# Patient Record
Sex: FEMALE | Race: WHITE | Employment: OTHER | URBAN - METROPOLITAN AREA
[De-identification: names, ages, dates, MRNs, and addresses within clinical notes are randomized per-mention and may not be internally consistent; named-entity substitution may affect disease eponyms.]

---

## 2017-11-20 ENCOUNTER — HOSPITAL ENCOUNTER (OUTPATIENT)
Dept: MAMMOGRAPHY | Age: 62
Discharge: HOME | End: 2017-11-20
Attending: FAMILY MEDICINE | Admitting: FAMILY MEDICINE

## 2017-11-20 DIAGNOSIS — Z12.31 VISIT FOR SCREENING MAMMOGRAM: ICD-10-CM

## 2018-11-21 ENCOUNTER — HOSPITAL ENCOUNTER (OUTPATIENT)
Dept: MAMMOGRAPHY | Age: 63
Discharge: HOME | End: 2018-11-26
Payer: MEDICARE

## 2018-11-21 DIAGNOSIS — Z12.31 VISIT FOR SCREENING MAMMOGRAM: ICD-10-CM

## 2018-11-21 PROCEDURE — 77067 SCR MAMMO BI INCL CAD: CPT

## 2019-08-13 ENCOUNTER — ANESTHESIA EVENT (OUTPATIENT)
Dept: OPERATING ROOM | Age: 64
DRG: 455 | End: 2019-08-13
Payer: MEDICARE

## 2019-08-13 RX ORDER — PREDNISONE 1 MG/1
10 TABLET ORAL DAILY PRN
COMMUNITY
Start: 2018-06-19

## 2019-08-13 RX ORDER — OXYCODONE HYDROCHLORIDE AND ACETAMINOPHEN 5; 325 MG/1; MG/1
TABLET ORAL
COMMUNITY
Start: 2019-07-27 | End: 2019-08-13

## 2019-08-13 RX ORDER — HYDROXYCHLOROQUINE SULFATE 200 MG/1
TABLET, FILM COATED ORAL
COMMUNITY
Start: 2018-06-06

## 2019-08-13 RX ORDER — CYCLOSPORINE 0.5 MG/ML
1 EMULSION OPHTHALMIC 2 TIMES DAILY
COMMUNITY
End: 2019-08-13 | Stop reason: ALTCHOICE

## 2019-08-13 RX ORDER — AZELASTINE HYDROCHLORIDE, FLUTICASONE PROPIONATE 137; 50 UG/1; UG/1
1 SPRAY, METERED NASAL
COMMUNITY
Start: 2018-10-23

## 2019-08-13 RX ORDER — NAPROXEN 500 MG/1
500 TABLET ORAL
Status: ON HOLD | COMMUNITY
Start: 2019-01-10 | End: 2019-08-16 | Stop reason: HOSPADM

## 2019-08-13 RX ORDER — BACLOFEN 10 MG/1
10 TABLET ORAL 4 TIMES DAILY
Status: ON HOLD | COMMUNITY
Start: 2019-07-16 | End: 2019-08-16 | Stop reason: HOSPADM

## 2019-08-13 RX ORDER — CYCLOSPORINE 0.5 MG/ML
EMULSION OPHTHALMIC
COMMUNITY
Start: 2019-02-23

## 2019-08-13 RX ORDER — LIDOCAINE 50 MG/G
1 PATCH TOPICAL
COMMUNITY
Start: 2019-01-28

## 2019-08-13 RX ORDER — CHLORAL HYDRATE 500 MG
3000 CAPSULE ORAL 3 TIMES DAILY
Status: ON HOLD | COMMUNITY
End: 2019-08-16 | Stop reason: HOSPADM

## 2019-08-13 RX ORDER — ALPRAZOLAM 1 MG/1
1 TABLET ORAL 2 TIMES DAILY
Status: ON HOLD | COMMUNITY
End: 2022-03-18

## 2019-08-13 RX ORDER — ALBUTEROL SULFATE 90 UG/1
2 AEROSOL, METERED RESPIRATORY (INHALATION) EVERY 6 HOURS PRN
COMMUNITY

## 2019-08-13 RX ORDER — OXYCODONE HYDROCHLORIDE AND ACETAMINOPHEN 5; 325 MG/1; MG/1
1 TABLET ORAL EVERY 4 HOURS PRN
Status: ON HOLD | COMMUNITY
End: 2019-08-16 | Stop reason: HOSPADM

## 2019-08-13 NOTE — PROGRESS NOTES
[address]                          Date of Procedure 8/14/19Time of Procedure  0730  PRIOR TO PROCEDURE DATE:  1. Please follow any guidelines/instructions prior to your procedure as advised by your surgeon. 2. Arrange for someone to drive you home and be with you for the first 24 hours after discharge for your safety after your procedure for which you received sedation. Ensure it is someone we can share information with regarding your discharge. 3. You must contact your surgeon for instructions IF:   You are taking any blood thinners, aspirin, anti-inflammatory or vitamin E.   There is a change in your physical condition such as a cold, fever, rash, cuts, sores or any other infection, especially near your surgical site. 4. Do not drink alcohol the day before or day of your procedure. 5. A Pre-op History and Physical for surgery MUST be completed by your Physician or Urgent Care within 30 days of your procedure date. Please bring a copy with you on the day of your procedure and along with any other testing performed. THE DAY OF YOUR PROCEDURE:  1. Follow instructions for ARRIVAL TIME as DIRECTED BY YOUR SURGEON. If your surgeon does not give you a specific arrival time, please arrive [id number]    2. Enter the MAIN entrance from [address] and follow the signs to the free [employer] or [place] parking (offered free of charge 6am-5pm). 3. Enter the Main Entrance of the hospital (do not enter from the lower level of the parking garage). Upon entrance, check in with the  at the main desk on your left. If no one is available at the desk, proceed into the [facility] Waiting Room and go through the door directly into the [facility]. There is a Check-in desk ACROSS from Room 5 (marked with a sign hanging from the ceiling). The phone number for the surgery center is [phone].     4. Please call [phone] option #2 option #2 if overnight, you may bring a bag with personal items. Please have any large items you may need brought in by your family after your arrival to your hospital room. 15. If you have a Living Will or Durable Power of , please bring a copy on the day of your procedure. 15. With your permission, one family member may accompany you while you are being prepared for surgery. Once you are ready, additional family members may join you. HOW WE KEEP YOU SAFE and WORK TO PREVENT SURGICAL SITE INFECTIONS:  1. Health care workers should always check your ID bracelet to verify your name and birth date. You will be asked many times to state your name, date of birth, and allergies. 2. Health care workers should always clean their hands with soap or alcohol gel before providing care to you. It is okay to ask anyone if they cleaned their hands before they touch you. 3. You will be actively involved in verifying the type of procedure you are having and ensuring the correct surgical site. This will be confirmed multiple times prior to your procedure. Do NOT sanjiv your surgery site UNLESS instructed to by your surgeon. 4. Do not shave or wax for 72 hours prior to procedure near your operative site. Shaving with a razor can irritate your skin and make it easier to develop an infection. On the day of your procedure, any hair that needs to be removed near the surgical site will be clipped by a healthcare worker using a special clippers designed to avoid skin irritation. 5. When you are in the operating room, your surgical site will be cleansed with a special soap, and in most cases, you will be given an antibiotic before the surgery begins. What to expect AFTER YOUR PROCEDURE:  1. Immediately following your procedure, your will be taken to the PACU for the first phase of your recovery.   Your nurse will help you recover from any potential side effects of anesthesia, such as extreme drowsiness, changes in your

## 2019-08-14 ENCOUNTER — APPOINTMENT (OUTPATIENT)
Dept: GENERAL RADIOLOGY | Age: 64
DRG: 455 | End: 2019-08-14
Attending: NEUROLOGICAL SURGERY
Payer: MEDICARE

## 2019-08-14 ENCOUNTER — HOSPITAL ENCOUNTER (INPATIENT)
Age: 64
LOS: 2 days | Discharge: HOME | DRG: 455 | End: 2019-08-16
Attending: NEUROLOGICAL SURGERY | Admitting: NEUROLOGICAL SURGERY
Payer: MEDICARE

## 2019-08-14 ENCOUNTER — ANESTHESIA (OUTPATIENT)
Dept: OPERATING ROOM | Age: 64
DRG: 455 | End: 2019-08-14
Payer: MEDICARE

## 2019-08-14 VITALS — DIASTOLIC BLOOD PRESSURE: 76 MMHG | OXYGEN SATURATION: 100 % | SYSTOLIC BLOOD PRESSURE: 137 MMHG | TEMPERATURE: 95.4 F

## 2019-08-14 DIAGNOSIS — Z98.1 S/P LUMBAR SPINAL FUSION: Primary | ICD-10-CM

## 2019-08-14 PROBLEM — M48.062 LUMBAR STENOSIS WITH NEUROGENIC CLAUDICATION: Status: ACTIVE | Noted: 2019-08-14

## 2019-08-14 LAB
ABO/RH: NORMAL
ANTIBODY SCREEN: NORMAL
CREAT SERPL-MCNC: 0.5 MG/DL (ref 0.6–1.2)
GFR AFRICAN AMERICAN: >60
GFR NON-AFRICAN AMERICAN: >60

## 2019-08-14 PROCEDURE — C1713 ANCHOR/SCREW BN/BN,TIS/BN: HCPCS | Performed by: NEUROLOGICAL SURGERY

## 2019-08-14 PROCEDURE — 36415 COLL VENOUS BLD VENIPUNCTURE: CPT

## 2019-08-14 PROCEDURE — 86850 RBC ANTIBODY SCREEN: CPT

## 2019-08-14 PROCEDURE — 2580000003 HC RX 258: Performed by: NURSE ANESTHETIST, CERTIFIED REGISTERED

## 2019-08-14 PROCEDURE — 7100000000 HC PACU RECOVERY - FIRST 15 MIN: Performed by: NEUROLOGICAL SURGERY

## 2019-08-14 PROCEDURE — 3700000000 HC ANESTHESIA ATTENDED CARE: Performed by: NEUROLOGICAL SURGERY

## 2019-08-14 PROCEDURE — 2700000000 HC OXYGEN THERAPY PER DAY

## 2019-08-14 PROCEDURE — 2580000003 HC RX 258: Performed by: ANESTHESIOLOGY

## 2019-08-14 PROCEDURE — 94770 HC ETCO2 MONITOR DAILY: CPT

## 2019-08-14 PROCEDURE — 2709999900 HC NON-CHARGEABLE SUPPLY: Performed by: NEUROLOGICAL SURGERY

## 2019-08-14 PROCEDURE — 7100000001 HC PACU RECOVERY - ADDTL 15 MIN: Performed by: NEUROLOGICAL SURGERY

## 2019-08-14 PROCEDURE — C1776 JOINT DEVICE (IMPLANTABLE): HCPCS | Performed by: NEUROLOGICAL SURGERY

## 2019-08-14 PROCEDURE — 0SB20ZZ EXCISION OF LUMBAR VERTEBRAL DISC, OPEN APPROACH: ICD-10-PCS | Performed by: NEUROLOGICAL SURGERY

## 2019-08-14 PROCEDURE — 86901 BLOOD TYPING SEROLOGIC RH(D): CPT

## 2019-08-14 PROCEDURE — 3209999900 FLUORO FOR SURGICAL PROCEDURES

## 2019-08-14 PROCEDURE — 6370000000 HC RX 637 (ALT 250 FOR IP): Performed by: NURSE PRACTITIONER

## 2019-08-14 PROCEDURE — 2500000003 HC RX 250 WO HCPCS: Performed by: NURSE PRACTITIONER

## 2019-08-14 PROCEDURE — 0SG10A0 FUSION OF 2 OR MORE LUMBAR VERTEBRAL JOINTS WITH INTERBODY FUSION DEVICE, ANTERIOR APPROACH, ANTERIOR COLUMN, OPEN APPROACH: ICD-10-PCS | Performed by: NEUROLOGICAL SURGERY

## 2019-08-14 PROCEDURE — 2500000003 HC RX 250 WO HCPCS: Performed by: ANESTHESIOLOGY

## 2019-08-14 PROCEDURE — 72100 X-RAY EXAM L-S SPINE 2/3 VWS: CPT

## 2019-08-14 PROCEDURE — 94761 N-INVAS EAR/PLS OXIMETRY MLT: CPT

## 2019-08-14 PROCEDURE — 3600000014 HC SURGERY LEVEL 4 ADDTL 15MIN: Performed by: NEUROLOGICAL SURGERY

## 2019-08-14 PROCEDURE — 86900 BLOOD TYPING SEROLOGIC ABO: CPT

## 2019-08-14 PROCEDURE — 6360000002 HC RX W HCPCS: Performed by: NURSE PRACTITIONER

## 2019-08-14 PROCEDURE — 1200000000 HC SEMI PRIVATE

## 2019-08-14 PROCEDURE — 2500000003 HC RX 250 WO HCPCS: Performed by: NURSE ANESTHETIST, CERTIFIED REGISTERED

## 2019-08-14 PROCEDURE — 2580000003 HC RX 258: Performed by: NEUROLOGICAL SURGERY

## 2019-08-14 PROCEDURE — 2500000003 HC RX 250 WO HCPCS: Performed by: NEUROLOGICAL SURGERY

## 2019-08-14 PROCEDURE — 82565 ASSAY OF CREATININE: CPT

## 2019-08-14 PROCEDURE — 94150 VITAL CAPACITY TEST: CPT

## 2019-08-14 PROCEDURE — 2780000010 HC IMPLANT OTHER: Performed by: NEUROLOGICAL SURGERY

## 2019-08-14 PROCEDURE — 6360000002 HC RX W HCPCS: Performed by: ANESTHESIOLOGY

## 2019-08-14 PROCEDURE — 2580000003 HC RX 258: Performed by: NURSE PRACTITIONER

## 2019-08-14 PROCEDURE — 94799 UNLISTED PULMONARY SVC/PX: CPT

## 2019-08-14 PROCEDURE — 3600000004 HC SURGERY LEVEL 4 BASE: Performed by: NEUROLOGICAL SURGERY

## 2019-08-14 PROCEDURE — 6360000002 HC RX W HCPCS: Performed by: NURSE ANESTHETIST, CERTIFIED REGISTERED

## 2019-08-14 PROCEDURE — 0SG10K1 FUSION OF 2 OR MORE LUMBAR VERTEBRAL JOINTS WITH NONAUTOLOGOUS TISSUE SUBSTITUTE, POSTERIOR APPROACH, POSTERIOR COLUMN, OPEN APPROACH: ICD-10-PCS | Performed by: NEUROLOGICAL SURGERY

## 2019-08-14 PROCEDURE — 2720000010 HC SURG SUPPLY STERILE: Performed by: NEUROLOGICAL SURGERY

## 2019-08-14 PROCEDURE — 3700000001 HC ADD 15 MINUTES (ANESTHESIA): Performed by: NEUROLOGICAL SURGERY

## 2019-08-14 PROCEDURE — 6360000002 HC RX W HCPCS: Performed by: NEUROLOGICAL SURGERY

## 2019-08-14 DEVICE — DBM 7509145 MAGNIFUSE 1.75 X 5 CM
Type: IMPLANTABLE DEVICE | Site: SPINE LUMBAR | Status: FUNCTIONAL
Brand: MAGNIFUSE® BONE GRAFT

## 2019-08-14 DEVICE — SCREW 55840006545 5.5/6 MAS 6.5X45 CC .
Type: IMPLANTABLE DEVICE | Site: SPINE LUMBAR | Status: FUNCTIONAL
Brand: CD HORIZON® SPINAL SYSTEM

## 2019-08-14 DEVICE — BONE GRAFT KIT 7510800 INFUSE LARGE II
Type: IMPLANTABLE DEVICE | Site: SPINE LUMBAR | Status: FUNCTIONAL
Brand: INFUSE® BONE GRAFT

## 2019-08-14 RX ORDER — SODIUM CHLORIDE, SODIUM LACTATE, POTASSIUM CHLORIDE, CALCIUM CHLORIDE 600; 310; 30; 20 MG/100ML; MG/100ML; MG/100ML; MG/100ML
INJECTION, SOLUTION INTRAVENOUS CONTINUOUS
Status: DISCONTINUED | OUTPATIENT
Start: 2019-08-14 | End: 2019-08-14

## 2019-08-14 RX ORDER — METHOCARBAMOL 750 MG/1
750 TABLET, FILM COATED ORAL EVERY 6 HOURS PRN
Status: DISCONTINUED | OUTPATIENT
Start: 2019-08-15 | End: 2019-08-16 | Stop reason: HOSPADM

## 2019-08-14 RX ORDER — HYDROXYCHLOROQUINE SULFATE 200 MG/1
200 TABLET, FILM COATED ORAL
Status: DISCONTINUED | OUTPATIENT
Start: 2019-08-16 | End: 2019-08-16 | Stop reason: HOSPADM

## 2019-08-14 RX ORDER — HYDROXYCHLOROQUINE SULFATE 200 MG/1
200 TABLET, FILM COATED ORAL
Status: DISCONTINUED | OUTPATIENT
Start: 2019-08-15 | End: 2019-08-16 | Stop reason: HOSPADM

## 2019-08-14 RX ORDER — LABETALOL 20 MG/4 ML (5 MG/ML) INTRAVENOUS SYRINGE
5
Status: DISCONTINUED | OUTPATIENT
Start: 2019-08-14 | End: 2019-08-16 | Stop reason: HOSPADM

## 2019-08-14 RX ORDER — PROMETHAZINE HYDROCHLORIDE 25 MG/ML
6.25 INJECTION, SOLUTION INTRAMUSCULAR; INTRAVENOUS
Status: DISCONTINUED | OUTPATIENT
Start: 2019-08-14 | End: 2019-08-14 | Stop reason: HOSPADM

## 2019-08-14 RX ORDER — LIDOCAINE 4 G/G
1 PATCH TOPICAL DAILY
Status: DISCONTINUED | OUTPATIENT
Start: 2019-08-14 | End: 2019-08-16 | Stop reason: HOSPADM

## 2019-08-14 RX ORDER — NALOXONE HYDROCHLORIDE 0.4 MG/ML
0.2 INJECTION, SOLUTION INTRAMUSCULAR; INTRAVENOUS; SUBCUTANEOUS PRN
Status: DISCONTINUED | OUTPATIENT
Start: 2019-08-14 | End: 2019-08-16 | Stop reason: HOSPADM

## 2019-08-14 RX ORDER — DEXAMETHASONE SODIUM PHOSPHATE 4 MG/ML
INJECTION, SOLUTION INTRA-ARTICULAR; INTRALESIONAL; INTRAMUSCULAR; INTRAVENOUS; SOFT TISSUE PRN
Status: DISCONTINUED | OUTPATIENT
Start: 2019-08-14 | End: 2019-08-14 | Stop reason: SDUPTHER

## 2019-08-14 RX ORDER — FENTANYL CITRATE 50 UG/ML
INJECTION, SOLUTION INTRAMUSCULAR; INTRAVENOUS PRN
Status: DISCONTINUED | OUTPATIENT
Start: 2019-08-14 | End: 2019-08-14 | Stop reason: SDUPTHER

## 2019-08-14 RX ORDER — OXYCODONE HYDROCHLORIDE 5 MG/1
5 TABLET ORAL EVERY 4 HOURS PRN
Status: DISCONTINUED | OUTPATIENT
Start: 2019-08-14 | End: 2019-08-16 | Stop reason: HOSPADM

## 2019-08-14 RX ORDER — LORAZEPAM 2 MG/ML
0.5 INJECTION INTRAMUSCULAR ONCE
Status: DISCONTINUED | OUTPATIENT
Start: 2019-08-14 | End: 2019-08-14

## 2019-08-14 RX ORDER — MIDAZOLAM HYDROCHLORIDE 1 MG/ML
INJECTION INTRAMUSCULAR; INTRAVENOUS PRN
Status: DISCONTINUED | OUTPATIENT
Start: 2019-08-14 | End: 2019-08-14 | Stop reason: SDUPTHER

## 2019-08-14 RX ORDER — HYDROMORPHONE HCL 110MG/55ML
PATIENT CONTROLLED ANALGESIA SYRINGE INTRAVENOUS PRN
Status: DISCONTINUED | OUTPATIENT
Start: 2019-08-14 | End: 2019-08-14 | Stop reason: SDUPTHER

## 2019-08-14 RX ORDER — OXYCODONE HYDROCHLORIDE 5 MG/1
10 TABLET ORAL EVERY 4 HOURS PRN
Status: DISCONTINUED | OUTPATIENT
Start: 2019-08-14 | End: 2019-08-16 | Stop reason: HOSPADM

## 2019-08-14 RX ORDER — FLUTICASONE PROPIONATE 50 MCG
1 SPRAY, SUSPENSION (ML) NASAL DAILY PRN
Status: DISCONTINUED | OUTPATIENT
Start: 2019-08-14 | End: 2019-08-16 | Stop reason: HOSPADM

## 2019-08-14 RX ORDER — POLYVINYL ALCOHOL 14 MG/ML
2 SOLUTION/ DROPS OPHTHALMIC PRN
Status: DISCONTINUED | OUTPATIENT
Start: 2019-08-14 | End: 2019-08-16 | Stop reason: HOSPADM

## 2019-08-14 RX ORDER — FENTANYL CITRATE 50 UG/ML
50 INJECTION, SOLUTION INTRAMUSCULAR; INTRAVENOUS EVERY 5 MIN PRN
Status: DISCONTINUED | OUTPATIENT
Start: 2019-08-14 | End: 2019-08-14 | Stop reason: HOSPADM

## 2019-08-14 RX ORDER — FENTANYL CITRATE 50 UG/ML
25 INJECTION, SOLUTION INTRAMUSCULAR; INTRAVENOUS EVERY 5 MIN PRN
Status: DISCONTINUED | OUTPATIENT
Start: 2019-08-14 | End: 2019-08-14 | Stop reason: HOSPADM

## 2019-08-14 RX ORDER — ONDANSETRON 2 MG/ML
INJECTION INTRAMUSCULAR; INTRAVENOUS PRN
Status: DISCONTINUED | OUTPATIENT
Start: 2019-08-14 | End: 2019-08-14 | Stop reason: SDUPTHER

## 2019-08-14 RX ORDER — SODIUM CHLORIDE, SODIUM LACTATE, POTASSIUM CHLORIDE, CALCIUM CHLORIDE 600; 310; 30; 20 MG/100ML; MG/100ML; MG/100ML; MG/100ML
INJECTION, SOLUTION INTRAVENOUS CONTINUOUS PRN
Status: DISCONTINUED | OUTPATIENT
Start: 2019-08-14 | End: 2019-08-14 | Stop reason: SDUPTHER

## 2019-08-14 RX ORDER — HYDROXYCHLOROQUINE SULFATE 200 MG/1
200 TABLET, FILM COATED ORAL SEE ADMIN INSTRUCTIONS
Status: DISCONTINUED | OUTPATIENT
Start: 2019-08-14 | End: 2019-08-14

## 2019-08-14 RX ORDER — LIDOCAINE HYDROCHLORIDE 20 MG/ML
INJECTION, SOLUTION INTRAVENOUS PRN
Status: DISCONTINUED | OUTPATIENT
Start: 2019-08-14 | End: 2019-08-14 | Stop reason: SDUPTHER

## 2019-08-14 RX ORDER — LORAZEPAM 2 MG/ML
0.5 INJECTION INTRAMUSCULAR
Status: ACTIVE | OUTPATIENT
Start: 2019-08-14 | End: 2019-08-14

## 2019-08-14 RX ORDER — PROPOFOL 10 MG/ML
INJECTION, EMULSION INTRAVENOUS PRN
Status: DISCONTINUED | OUTPATIENT
Start: 2019-08-14 | End: 2019-08-14 | Stop reason: SDUPTHER

## 2019-08-14 RX ORDER — SODIUM CHLORIDE 9 MG/ML
INJECTION, SOLUTION INTRAVENOUS
Status: DISPENSED
Start: 2019-08-14 | End: 2019-08-15

## 2019-08-14 RX ORDER — ONDANSETRON 2 MG/ML
4 INJECTION INTRAMUSCULAR; INTRAVENOUS
Status: DISCONTINUED | OUTPATIENT
Start: 2019-08-14 | End: 2019-08-14 | Stop reason: HOSPADM

## 2019-08-14 RX ORDER — ROCURONIUM BROMIDE 10 MG/ML
INJECTION, SOLUTION INTRAVENOUS PRN
Status: DISCONTINUED | OUTPATIENT
Start: 2019-08-14 | End: 2019-08-14 | Stop reason: SDUPTHER

## 2019-08-14 RX ORDER — SODIUM CHLORIDE 0.9 % (FLUSH) 0.9 %
10 SYRINGE (ML) INJECTION PRN
Status: DISCONTINUED | OUTPATIENT
Start: 2019-08-14 | End: 2019-08-16 | Stop reason: HOSPADM

## 2019-08-14 RX ORDER — BISACODYL 10 MG
10 SUPPOSITORY, RECTAL RECTAL DAILY PRN
Status: DISCONTINUED | OUTPATIENT
Start: 2019-08-14 | End: 2019-08-15

## 2019-08-14 RX ORDER — ONDANSETRON 2 MG/ML
4 INJECTION INTRAMUSCULAR; INTRAVENOUS EVERY 6 HOURS PRN
Status: DISCONTINUED | OUTPATIENT
Start: 2019-08-14 | End: 2019-08-16 | Stop reason: HOSPADM

## 2019-08-14 RX ORDER — DIPHENHYDRAMINE HYDROCHLORIDE 50 MG/ML
25 INJECTION INTRAMUSCULAR; INTRAVENOUS EVERY 6 HOURS PRN
Status: DISCONTINUED | OUTPATIENT
Start: 2019-08-14 | End: 2019-08-15

## 2019-08-14 RX ORDER — DOCUSATE SODIUM 100 MG/1
100 CAPSULE, LIQUID FILLED ORAL 2 TIMES DAILY
Status: DISCONTINUED | OUTPATIENT
Start: 2019-08-14 | End: 2019-08-16 | Stop reason: HOSPADM

## 2019-08-14 RX ORDER — LORAZEPAM 2 MG/ML
INJECTION INTRAMUSCULAR
Status: DISPENSED
Start: 2019-08-14 | End: 2019-08-15

## 2019-08-14 RX ORDER — DEXTROSE AND SODIUM CHLORIDE 5; .45 G/100ML; G/100ML
INJECTION, SOLUTION INTRAVENOUS CONTINUOUS
Status: DISCONTINUED | OUTPATIENT
Start: 2019-08-14 | End: 2019-08-16 | Stop reason: HOSPADM

## 2019-08-14 RX ORDER — SODIUM CHLORIDE 0.9 % (FLUSH) 0.9 %
10 SYRINGE (ML) INJECTION EVERY 12 HOURS SCHEDULED
Status: DISCONTINUED | OUTPATIENT
Start: 2019-08-14 | End: 2019-08-16 | Stop reason: HOSPADM

## 2019-08-14 RX ORDER — ALPRAZOLAM 0.5 MG/1
1 TABLET ORAL 2 TIMES DAILY
Status: DISCONTINUED | OUTPATIENT
Start: 2019-08-14 | End: 2019-08-16 | Stop reason: HOSPADM

## 2019-08-14 RX ORDER — ALBUTEROL SULFATE 2.5 MG/3ML
2.5 SOLUTION RESPIRATORY (INHALATION) EVERY 4 HOURS PRN
Status: DISCONTINUED | OUTPATIENT
Start: 2019-08-14 | End: 2019-08-16 | Stop reason: HOSPADM

## 2019-08-14 RX ADMIN — METHOCARBAMOL 1000 MG: 100 INJECTION, SOLUTION INTRAMUSCULAR; INTRAVENOUS at 11:43

## 2019-08-14 RX ADMIN — HYDROMORPHONE HYDROCHLORIDE 0.5 MG: 1 INJECTION, SOLUTION INTRAMUSCULAR; INTRAVENOUS; SUBCUTANEOUS at 12:00

## 2019-08-14 RX ADMIN — ROCURONIUM BROMIDE 20 MG: 10 INJECTION, SOLUTION INTRAVENOUS at 10:17

## 2019-08-14 RX ADMIN — ROCURONIUM BROMIDE 20 MG: 10 INJECTION, SOLUTION INTRAVENOUS at 09:23

## 2019-08-14 RX ADMIN — MIDAZOLAM HYDROCHLORIDE 2 MG: 2 INJECTION, SOLUTION INTRAMUSCULAR; INTRAVENOUS at 07:30

## 2019-08-14 RX ADMIN — ROCURONIUM BROMIDE 30 MG: 10 INJECTION, SOLUTION INTRAVENOUS at 08:58

## 2019-08-14 RX ADMIN — HYDROMORPHONE HYDROCHLORIDE 1 MG: 10 INJECTION INTRAMUSCULAR; INTRAVENOUS; SUBCUTANEOUS at 12:01

## 2019-08-14 RX ADMIN — ALPRAZOLAM 1 MG: 0.5 TABLET ORAL at 20:48

## 2019-08-14 RX ADMIN — PROPOFOL 50 MG: 10 INJECTION, EMULSION INTRAVENOUS at 08:09

## 2019-08-14 RX ADMIN — PHENYLEPHRINE HYDROCHLORIDE 100 MCG: 10 INJECTION, SOLUTION INTRAMUSCULAR; INTRAVENOUS; SUBCUTANEOUS at 10:04

## 2019-08-14 RX ADMIN — Medication 2 G: at 07:43

## 2019-08-14 RX ADMIN — ONDANSETRON 4 MG: 2 INJECTION INTRAMUSCULAR; INTRAVENOUS at 17:51

## 2019-08-14 RX ADMIN — ONDANSETRON 4 MG: 2 INJECTION INTRAMUSCULAR; INTRAVENOUS at 10:45

## 2019-08-14 RX ADMIN — FENTANYL CITRATE 50 MCG: 50 INJECTION INTRAMUSCULAR; INTRAVENOUS at 07:41

## 2019-08-14 RX ADMIN — FENTANYL CITRATE 50 MCG: 50 INJECTION INTRAMUSCULAR; INTRAVENOUS at 08:09

## 2019-08-14 RX ADMIN — OXYCODONE HYDROCHLORIDE 10 MG: 5 TABLET ORAL at 20:23

## 2019-08-14 RX ADMIN — FENTANYL CITRATE 50 MCG: 50 INJECTION INTRAMUSCULAR; INTRAVENOUS at 07:38

## 2019-08-14 RX ADMIN — METHOCARBAMOL 1000 MG: 100 INJECTION, SOLUTION INTRAMUSCULAR; INTRAVENOUS at 19:42

## 2019-08-14 RX ADMIN — DEXTROSE AND SODIUM CHLORIDE: 5; 450 INJECTION, SOLUTION INTRAVENOUS at 11:43

## 2019-08-14 RX ADMIN — ROCURONIUM BROMIDE 10 MG: 10 INJECTION, SOLUTION INTRAVENOUS at 08:01

## 2019-08-14 RX ADMIN — ROCURONIUM BROMIDE 10 MG: 10 INJECTION, SOLUTION INTRAVENOUS at 09:57

## 2019-08-14 RX ADMIN — DEXAMETHASONE SODIUM PHOSPHATE 8 MG: 4 INJECTION, SOLUTION INTRAMUSCULAR; INTRAVENOUS at 07:47

## 2019-08-14 RX ADMIN — DIPHENHYDRAMINE HYDROCHLORIDE 25 MG: 50 INJECTION, SOLUTION INTRAMUSCULAR; INTRAVENOUS at 22:35

## 2019-08-14 RX ADMIN — FAMOTIDINE 20 MG: 10 INJECTION, SOLUTION INTRAVENOUS at 20:48

## 2019-08-14 RX ADMIN — CEFAZOLIN SODIUM 2 G: 10 INJECTION, POWDER, FOR SOLUTION INTRAVENOUS at 17:46

## 2019-08-14 RX ADMIN — SODIUM CHLORIDE, SODIUM LACTATE, POTASSIUM CHLORIDE, AND CALCIUM CHLORIDE: 600; 310; 30; 20 INJECTION, SOLUTION INTRAVENOUS at 07:28

## 2019-08-14 RX ADMIN — ROCURONIUM BROMIDE 40 MG: 10 INJECTION, SOLUTION INTRAVENOUS at 07:38

## 2019-08-14 RX ADMIN — LIDOCAINE HYDROCHLORIDE 60 MG: 20 INJECTION, SOLUTION INTRAVENOUS at 07:38

## 2019-08-14 RX ADMIN — ROCURONIUM BROMIDE 20 MG: 10 INJECTION, SOLUTION INTRAVENOUS at 09:28

## 2019-08-14 RX ADMIN — HYDROMORPHONE HYDROCHLORIDE 0.5 MG: 1 INJECTION, SOLUTION INTRAMUSCULAR; INTRAVENOUS; SUBCUTANEOUS at 11:42

## 2019-08-14 RX ADMIN — FENTANYL CITRATE 25 MCG: 50 INJECTION, SOLUTION INTRAMUSCULAR; INTRAVENOUS at 11:24

## 2019-08-14 RX ADMIN — FENTANYL CITRATE 50 MCG: 50 INJECTION INTRAMUSCULAR; INTRAVENOUS at 08:20

## 2019-08-14 RX ADMIN — PROPOFOL 200 MG: 10 INJECTION, EMULSION INTRAVENOUS at 07:38

## 2019-08-14 RX ADMIN — SUGAMMADEX 200 MG: 100 INJECTION, SOLUTION INTRAVENOUS at 10:45

## 2019-08-14 RX ADMIN — HYDROMORPHONE HYDROCHLORIDE 1 MG: 2 INJECTION, SOLUTION INTRAMUSCULAR; INTRAVENOUS; SUBCUTANEOUS at 08:41

## 2019-08-14 RX ADMIN — PHENYLEPHRINE HYDROCHLORIDE 100 MCG: 10 INJECTION, SOLUTION INTRAMUSCULAR; INTRAVENOUS; SUBCUTANEOUS at 10:15

## 2019-08-14 RX ADMIN — DOCUSATE SODIUM 100 MG: 100 CAPSULE, LIQUID FILLED ORAL at 20:48

## 2019-08-14 RX ADMIN — LABETALOL 20 MG/4 ML (5 MG/ML) INTRAVENOUS SYRINGE 5 MG: at 12:24

## 2019-08-14 RX ADMIN — SODIUM CHLORIDE, POTASSIUM CHLORIDE, SODIUM LACTATE AND CALCIUM CHLORIDE: 600; 310; 30; 20 INJECTION, SOLUTION INTRAVENOUS at 06:58

## 2019-08-14 RX ADMIN — LABETALOL 20 MG/4 ML (5 MG/ML) INTRAVENOUS SYRINGE 5 MG: at 12:00

## 2019-08-14 RX ADMIN — Medication 10 ML: at 22:25

## 2019-08-14 ASSESSMENT — PULMONARY FUNCTION TESTS
PIF_VALUE: 24
PIF_VALUE: 0
PIF_VALUE: 22
PIF_VALUE: 23
PIF_VALUE: 6
PIF_VALUE: 22
PIF_VALUE: 1
PIF_VALUE: 22
PIF_VALUE: 20
PIF_VALUE: 28
PIF_VALUE: 1
PIF_VALUE: 22
PIF_VALUE: 22
PIF_VALUE: 27
PIF_VALUE: 27
PIF_VALUE: 22
PIF_VALUE: 21
PIF_VALUE: 23
PIF_VALUE: 23
PIF_VALUE: 0
PIF_VALUE: 27
PIF_VALUE: 23
PIF_VALUE: 22
PIF_VALUE: 28
PIF_VALUE: 22
PIF_VALUE: 23
PIF_VALUE: 8
PIF_VALUE: 22
PIF_VALUE: 6
PIF_VALUE: 8
PIF_VALUE: 28
PIF_VALUE: 27
PIF_VALUE: 22
PIF_VALUE: 24
PIF_VALUE: 22
PIF_VALUE: 23
PIF_VALUE: 24
PIF_VALUE: 27
PIF_VALUE: 23
PIF_VALUE: 24
PIF_VALUE: 19
PIF_VALUE: 22
PIF_VALUE: 22
PIF_VALUE: 21
PIF_VALUE: 23
PIF_VALUE: 24
PIF_VALUE: 23
PIF_VALUE: 24
PIF_VALUE: 22
PIF_VALUE: 23
PIF_VALUE: 1
PIF_VALUE: 22
PIF_VALUE: 23
PIF_VALUE: 22
PIF_VALUE: 27
PIF_VALUE: 23
PIF_VALUE: 14
PIF_VALUE: 25
PIF_VALUE: 22
PIF_VALUE: 22
PIF_VALUE: 27
PIF_VALUE: 27
PIF_VALUE: 1
PIF_VALUE: 24
PIF_VALUE: 22
PIF_VALUE: 22
PIF_VALUE: 21
PIF_VALUE: 24
PIF_VALUE: 21
PIF_VALUE: 23
PIF_VALUE: 27
PIF_VALUE: 22
PIF_VALUE: 1
PIF_VALUE: 24
PIF_VALUE: 23
PIF_VALUE: 22
PIF_VALUE: 23
PIF_VALUE: 23
PIF_VALUE: 24
PIF_VALUE: 22
PIF_VALUE: 27
PIF_VALUE: 27
PIF_VALUE: 23
PIF_VALUE: 27
PIF_VALUE: 24
PIF_VALUE: 23
PIF_VALUE: 20
PIF_VALUE: 24
PIF_VALUE: 27
PIF_VALUE: 23
PIF_VALUE: 27
PIF_VALUE: 27
PIF_VALUE: 19
PIF_VALUE: 22
PIF_VALUE: 8
PIF_VALUE: 22
PIF_VALUE: 22
PIF_VALUE: 28
PIF_VALUE: 22
PIF_VALUE: 23
PIF_VALUE: 27
PIF_VALUE: 27
PIF_VALUE: 26
PIF_VALUE: 22
PIF_VALUE: 27
PIF_VALUE: 23
PIF_VALUE: 22
PIF_VALUE: 3
PIF_VALUE: 21
PIF_VALUE: 22
PIF_VALUE: 2
PIF_VALUE: 27
PIF_VALUE: 22
PIF_VALUE: 27
PIF_VALUE: 26
PIF_VALUE: 26
PIF_VALUE: 22
PIF_VALUE: 27
PIF_VALUE: 22
PIF_VALUE: 1
PIF_VALUE: 24
PIF_VALUE: 22
PIF_VALUE: 27
PIF_VALUE: 23
PIF_VALUE: 22
PIF_VALUE: 21
PIF_VALUE: 22
PIF_VALUE: 8
PIF_VALUE: 27
PIF_VALUE: 23
PIF_VALUE: 22
PIF_VALUE: 22
PIF_VALUE: 27
PIF_VALUE: 27
PIF_VALUE: 22
PIF_VALUE: 24
PIF_VALUE: 1
PIF_VALUE: 22
PIF_VALUE: 22
PIF_VALUE: 23
PIF_VALUE: 22
PIF_VALUE: 8
PIF_VALUE: 22
PIF_VALUE: 22
PIF_VALUE: 23
PIF_VALUE: 24
PIF_VALUE: 22
PIF_VALUE: 23
PIF_VALUE: 22
PIF_VALUE: 22
PIF_VALUE: 23
PIF_VALUE: 8
PIF_VALUE: 27
PIF_VALUE: 22
PIF_VALUE: 22
PIF_VALUE: 25
PIF_VALUE: 22
PIF_VALUE: 21
PIF_VALUE: 22
PIF_VALUE: 23
PIF_VALUE: 8
PIF_VALUE: 2
PIF_VALUE: 22
PIF_VALUE: 24
PIF_VALUE: 22
PIF_VALUE: 21
PIF_VALUE: 22
PIF_VALUE: 22
PIF_VALUE: 0
PIF_VALUE: 22
PIF_VALUE: 24
PIF_VALUE: 21
PIF_VALUE: 22
PIF_VALUE: 21
PIF_VALUE: 21
PIF_VALUE: 22
PIF_VALUE: 23
PIF_VALUE: 26
PIF_VALUE: 22
PIF_VALUE: 21
PIF_VALUE: 10
PIF_VALUE: 22
PIF_VALUE: 19
PIF_VALUE: 22
PIF_VALUE: 22
PIF_VALUE: 24
PIF_VALUE: 22
PIF_VALUE: 27
PIF_VALUE: 23

## 2019-08-14 ASSESSMENT — PAIN DESCRIPTION - ONSET: ONSET: ON-GOING

## 2019-08-14 ASSESSMENT — PAIN DESCRIPTION - LOCATION
LOCATION: BACK
LOCATION: BACK;LEG
LOCATION: BACK

## 2019-08-14 ASSESSMENT — PAIN SCALES - GENERAL
PAINLEVEL_OUTOF10: 8
PAINLEVEL_OUTOF10: 7
PAINLEVEL_OUTOF10: 7
PAINLEVEL_OUTOF10: 6
PAINLEVEL_OUTOF10: 8
PAINLEVEL_OUTOF10: 10
PAINLEVEL_OUTOF10: 7
PAINLEVEL_OUTOF10: 7
PAINLEVEL_OUTOF10: 6
PAINLEVEL_OUTOF10: 0

## 2019-08-14 ASSESSMENT — PAIN DESCRIPTION - PAIN TYPE
TYPE: SURGICAL PAIN

## 2019-08-14 ASSESSMENT — PAIN DESCRIPTION - DESCRIPTORS
DESCRIPTORS: ACHING

## 2019-08-14 ASSESSMENT — PAIN DESCRIPTION - FREQUENCY: FREQUENCY: CONTINUOUS

## 2019-08-14 ASSESSMENT — LIFESTYLE VARIABLES: SMOKING_STATUS: 0

## 2019-08-14 ASSESSMENT — PAIN - FUNCTIONAL ASSESSMENT: PAIN_FUNCTIONAL_ASSESSMENT: 0-10

## 2019-08-14 ASSESSMENT — PAIN DESCRIPTION - PROGRESSION: CLINICAL_PROGRESSION: GRADUALLY WORSENING

## 2019-08-14 NOTE — ANESTHESIA PRE PROCEDURE
medications for this encounter. Current Outpatient Medications   Medication Sig Dispense Refill    Azelastine-Fluticasone 137-50 MCG/ACT SUSP 1 spray by Nasal route      CALCIUM PO Take by mouth      baclofen (LIORESAL) 10 MG tablet Take 10 mg by mouth 4 times daily       ALPRAZolam (XANAX) 1 MG tablet Take 1 mg by mouth 2 times daily.  cycloSPORINE (RESTASIS) 0.05 % ophthalmic emulsion Apply to eye      hydroxychloroquine (PLAQUENIL) 200 MG tablet 1 QD -Thursday and 1 BID Fri-Sun      lidocaine (LIDODERM) 5 % Place 1 patch onto the skin      naproxen (NAPROSYN) 500 MG tablet Take 500 mg by mouth      predniSONE (DELTASONE) 5 MG tablet TAKE 1 TABLET BY MOUTH EVERY DAY      Omega-3 Fatty Acids (FISH OIL) 1000 MG CAPS Take 3,000 mg by mouth 3 times daily      oxyCODONE-acetaminophen (PERCOCET) 5-325 MG per tablet Take 1 tablet by mouth every 4 hours as needed for Pain.  albuterol sulfate  (90 Base) MCG/ACT inhaler Inhale 2 puffs into the lungs every 6 hours as needed for Wheezing         Allergies: Allergies   Allergen Reactions    Gabapentin      LYRICA CYMBALTA  SUICIDIAL IDEATIONS    Hydrocodone-Acetaminophen      Burning    Morphine Hives     Other reaction(s): Other (See Comments)  Arm got red and swollen      Tizanidine      Nightmares       Problem List:  There is no problem list on file for this patient.       Past Medical History:        Diagnosis Date    Arthritis     RHUE    Back disorder     Carpal tunnel syndrome     GERD (gastroesophageal reflux disease)     Hepatitis C     Osteoporosis        Past Surgical History:        Procedure Laterality Date    TUBAL LIGATION      TUNNELED VENOUS PORT PLACEMENT         Social History:    Social History     Tobacco Use    Smoking status: Former Smoker     Last attempt to quit: 1994     Years since quittin.0    Smokeless tobacco: Never Used   Substance Use Topics    Alcohol use: Not Currently Counseling given: Not Answered      Vital Signs (Current):   Vitals:    08/13/19 1236   Weight: 122 lb (55.3 kg)   Height: 5' 1\" (1.549 m)                                              BP Readings from Last 3 Encounters:   No data found for BP       NPO Status:                                                                                 BMI:   Wt Readings from Last 3 Encounters:   No data found for Wt     Body mass index is 23.05 kg/m². CBC: No results found for: WBC, RBC, HGB, HCT, MCV, RDW, PLT    CMP: No results found for: NA, K, CL, CO2, BUN, CREATININE, GFRAA, AGRATIO, LABGLOM, GLUCOSE, PROT, CALCIUM, BILITOT, ALKPHOS, AST, ALT    POC Tests: No results for input(s): POCGLU, POCNA, POCK, POCCL, POCBUN, POCHEMO, POCHCT in the last 72 hours.     Coags: No results found for: PROTIME, INR, APTT    HCG (If Applicable): No results found for: PREGTESTUR, PREGSERUM, HCG, HCGQUANT     ABGs: No results found for: PHART, PO2ART, LCE5TMV, VRJ3HQY, BEART, [id number]     Type & Screen (If Applicable):  No results found for: LABABO, [address]    Anesthesia Evaluation  Patient summary reviewed and Nursing notes reviewed no history of anesthetic complications:   Airway: Mallampati: II  TM distance: >3 FB   Neck ROM: full  Mouth opening: > = 3 FB Dental: normal exam   (+) upper dentures and lower dentures      Pulmonary:Negative Pulmonary ROS and normal exam  breath sounds clear to auscultation      (-) recent URI, sleep apnea and not a current smoker                           Cardiovascular:Negative CV ROS            Rhythm: regular  Rate: normal                    Neuro/Psych:                ROS comment: Lumbar stenosis GI/Hepatic/Renal:   (+) GERD: well controlled, hepatitis: C,      (-) hiatal hernia and no renal disease      ROS comment: Treated for Hep C.   Endo/Other:        (-) diabetes mellitus, hypothyroidism                ROS comment: Osteoporosis Abdominal:           Vascular: negative vascular

## 2019-08-14 NOTE — PROGRESS NOTES
RESPIRATORY THERAPY ASSESSMENT    Name:  M[facility]w [facility],[address] Floor [facility] Record Number:  [id number]  Age: 61 y.o. Gender: female  : 1955  Today's Date:  2019  Room:  5523/5523-01    Assessment     Is the patient being admitted for a COPD or Asthma exacerbation? No   (If yes the patient will be seen every 4 hours for the first 24 hours and then reassessed)    Patient Admission Diagnosis      Allergies  Allergies   Allergen Reactions    Gabapentin      LYRICA CYMBALTA  SUICIDIAL IDEATIONS    Hydrocodone-Acetaminophen      Burning    Morphine Hives     Other reaction(s): Other (See Comments)  Arm got red and swollen      Tizanidine      Nightmares       Minimum Predicted Vital Capacity:     714          Actual Vital Capacity:      1000              Pulmonary History: ex smoker  Home Oxygen Therapy: none  Home Respiratory Therapy: Albuterol Q4H PRN   Current Respiratory Therapy:  HHN albuterol Q4H PRN  Treatment Type: IS       Respiratory Severity Index(RSI)   Patients with orders for inhalation medications, oxygen, or any therapeutic treatment modality will be placed on Respiratory Protocol. They will be assessed with the first treatment and at least every 72 hours thereafter. The following severity scale will be used to determine frequency of treatment intervention.     Smoking History: Pulmonary Disease or Smoking History, Greater than 15 pack year = 2    Social History  Social History     Tobacco Use    Smoking status: Former Smoker     Last attempt to quit: 1994     Years since quittin.0    Smokeless tobacco: Never Used   Substance Use Topics    Alcohol use: Not Currently    Drug use: Never       Recent Surgical History: Surgery of Extremities = 1  Past Surgical History  Past Surgical History:   Procedure Laterality Date    TUBAL LIGATION      TUNNELED VENOUS PORT PLACEMENT         Level of Consciousness: Alert, Oriented, and Cooperative = 0    Level of Activity: Walking with assistance

## 2019-08-14 NOTE — H&P
lb (55.3 kg)   LMP  (LMP Unknown)   SpO2 96%   BMI 23.05 kg/m²      Heart:  Regular rate and rhythm, No murmur noted    Lungs: No increased work of breathing, good air exchange, clear to ausculation bilaterally     Abdomen:  Soft, non-distended, non-tender, normal active bowel sounds, no masses palpated    ASSESSMENT AND PLAN:    1. Patient seen and focused exam done today- no new changes since last physical exam on 8/5/19    2. Access to ancillary services are available per request of the provider.     JONAS Stubbs - CNP     8/14/2019

## 2019-08-15 ENCOUNTER — APPOINTMENT (OUTPATIENT)
Dept: CT IMAGING | Age: 64
DRG: 455 | End: 2019-08-15
Attending: NEUROLOGICAL SURGERY
Payer: MEDICARE

## 2019-08-15 PROCEDURE — 6370000000 HC RX 637 (ALT 250 FOR IP): Performed by: NURSE PRACTITIONER

## 2019-08-15 PROCEDURE — 97166 OT EVAL MOD COMPLEX 45 MIN: CPT

## 2019-08-15 PROCEDURE — 2500000003 HC RX 250 WO HCPCS: Performed by: NURSE PRACTITIONER

## 2019-08-15 PROCEDURE — 72131 CT LUMBAR SPINE W/O DYE: CPT

## 2019-08-15 PROCEDURE — 2580000003 HC RX 258: Performed by: NURSE PRACTITIONER

## 2019-08-15 PROCEDURE — 6370000000 HC RX 637 (ALT 250 FOR IP): Performed by: NEUROLOGICAL SURGERY

## 2019-08-15 PROCEDURE — 97530 THERAPEUTIC ACTIVITIES: CPT

## 2019-08-15 PROCEDURE — 6360000002 HC RX W HCPCS: Performed by: NURSE PRACTITIONER

## 2019-08-15 PROCEDURE — 1200000000 HC SEMI PRIVATE

## 2019-08-15 PROCEDURE — 97162 PT EVAL MOD COMPLEX 30 MIN: CPT

## 2019-08-15 PROCEDURE — 97535 SELF CARE MNGMENT TRAINING: CPT

## 2019-08-15 PROCEDURE — 97116 GAIT TRAINING THERAPY: CPT

## 2019-08-15 RX ORDER — METOCLOPRAMIDE HYDROCHLORIDE 5 MG/ML
10 INJECTION INTRAMUSCULAR; INTRAVENOUS EVERY 6 HOURS
Status: COMPLETED | OUTPATIENT
Start: 2019-08-15 | End: 2019-08-16

## 2019-08-15 RX ORDER — OXYCODONE HYDROCHLORIDE AND ACETAMINOPHEN 5; 325 MG/1; MG/1
1 TABLET ORAL EVERY 4 HOURS PRN
Status: DISCONTINUED | OUTPATIENT
Start: 2019-08-15 | End: 2019-08-16 | Stop reason: HOSPADM

## 2019-08-15 RX ORDER — OXYCODONE HYDROCHLORIDE AND ACETAMINOPHEN 5; 325 MG/1; MG/1
2 TABLET ORAL EVERY 4 HOURS PRN
Status: DISCONTINUED | OUTPATIENT
Start: 2019-08-15 | End: 2019-08-16 | Stop reason: HOSPADM

## 2019-08-15 RX ADMIN — ENOXAPARIN SODIUM 40 MG: 40 INJECTION SUBCUTANEOUS at 08:15

## 2019-08-15 RX ADMIN — METOCLOPRAMIDE 10 MG: 5 INJECTION, SOLUTION INTRAMUSCULAR; INTRAVENOUS at 12:56

## 2019-08-15 RX ADMIN — METHOCARBAMOL TABLETS 750 MG: 750 TABLET, COATED ORAL at 18:55

## 2019-08-15 RX ADMIN — OXYCODONE HYDROCHLORIDE 10 MG: 5 TABLET ORAL at 12:55

## 2019-08-15 RX ADMIN — METHOCARBAMOL 1000 MG: 100 INJECTION, SOLUTION INTRAMUSCULAR; INTRAVENOUS at 02:18

## 2019-08-15 RX ADMIN — FAMOTIDINE 20 MG: 10 INJECTION, SOLUTION INTRAVENOUS at 08:15

## 2019-08-15 RX ADMIN — HYDROXYCHLOROQUINE SULFATE 200 MG: 200 TABLET, FILM COATED ORAL at 08:16

## 2019-08-15 RX ADMIN — DOCUSATE SODIUM 100 MG: 100 CAPSULE, LIQUID FILLED ORAL at 08:15

## 2019-08-15 RX ADMIN — ALPRAZOLAM 1 MG: 0.5 TABLET ORAL at 20:43

## 2019-08-15 RX ADMIN — FAMOTIDINE 20 MG: 10 INJECTION, SOLUTION INTRAVENOUS at 20:43

## 2019-08-15 RX ADMIN — ALPRAZOLAM 1 MG: 0.5 TABLET ORAL at 08:15

## 2019-08-15 RX ADMIN — Medication 10 ML: at 09:00

## 2019-08-15 RX ADMIN — METOCLOPRAMIDE 10 MG: 5 INJECTION, SOLUTION INTRAMUSCULAR; INTRAVENOUS at 18:55

## 2019-08-15 RX ADMIN — OXYCODONE HYDROCHLORIDE AND ACETAMINOPHEN 2 TABLET: 5; 325 TABLET ORAL at 08:20

## 2019-08-15 RX ADMIN — CEFAZOLIN SODIUM 2 G: 10 INJECTION, POWDER, FOR SOLUTION INTRAVENOUS at 00:53

## 2019-08-15 RX ADMIN — Medication 10 ML: at 20:44

## 2019-08-15 RX ADMIN — OXYCODONE HYDROCHLORIDE AND ACETAMINOPHEN 2 TABLET: 5; 325 TABLET ORAL at 00:31

## 2019-08-15 RX ADMIN — OXYCODONE HYDROCHLORIDE AND ACETAMINOPHEN 2 TABLET: 5; 325 TABLET ORAL at 04:36

## 2019-08-15 RX ADMIN — DOCUSATE SODIUM 100 MG: 100 CAPSULE, LIQUID FILLED ORAL at 20:43

## 2019-08-15 RX ADMIN — OXYCODONE HYDROCHLORIDE AND ACETAMINOPHEN 2 TABLET: 5; 325 TABLET ORAL at 20:42

## 2019-08-15 RX ADMIN — OXYCODONE HYDROCHLORIDE 10 MG: 5 TABLET ORAL at 16:43

## 2019-08-15 ASSESSMENT — PAIN DESCRIPTION - PROGRESSION
CLINICAL_PROGRESSION: GRADUALLY WORSENING
CLINICAL_PROGRESSION: NOT CHANGED

## 2019-08-15 ASSESSMENT — PAIN DESCRIPTION - DIRECTION
RADIATING_TOWARDS: LEFT LEG
RADIATING_TOWARDS: LEG LEFT
RADIATING_TOWARDS: LEFT LEG

## 2019-08-15 ASSESSMENT — PAIN DESCRIPTION - ORIENTATION
ORIENTATION: LOWER
ORIENTATION: ANTERIOR;LOWER
ORIENTATION: LOWER

## 2019-08-15 ASSESSMENT — PAIN DESCRIPTION - PAIN TYPE
TYPE: SURGICAL PAIN
TYPE: ACUTE PAIN;SURGICAL PAIN
TYPE: ACUTE PAIN;SURGICAL PAIN
TYPE: SURGICAL PAIN
TYPE: ACUTE PAIN;SURGICAL PAIN
TYPE: SURGICAL PAIN
TYPE: ACUTE PAIN;SURGICAL PAIN

## 2019-08-15 ASSESSMENT — PAIN DESCRIPTION - FREQUENCY
FREQUENCY: CONTINUOUS

## 2019-08-15 ASSESSMENT — PAIN DESCRIPTION - ONSET
ONSET: ON-GOING

## 2019-08-15 ASSESSMENT — PAIN DESCRIPTION - LOCATION
LOCATION: BACK
LOCATION: BACK
LOCATION: ARM;BACK
LOCATION: BACK

## 2019-08-15 ASSESSMENT — PAIN SCALES - GENERAL
PAINLEVEL_OUTOF10: 9
PAINLEVEL_OUTOF10: 10
PAINLEVEL_OUTOF10: 9
PAINLEVEL_OUTOF10: 10
PAINLEVEL_OUTOF10: 7
PAINLEVEL_OUTOF10: 0
PAINLEVEL_OUTOF10: 4
PAINLEVEL_OUTOF10: 4
PAINLEVEL_OUTOF10: 8
PAINLEVEL_OUTOF10: 0
PAINLEVEL_OUTOF10: 7
PAINLEVEL_OUTOF10: 4
PAINLEVEL_OUTOF10: 8
PAINLEVEL_OUTOF10: 0

## 2019-08-15 ASSESSMENT — PAIN DESCRIPTION - DESCRIPTORS
DESCRIPTORS: ACHING
DESCRIPTORS: ACHING;BURNING
DESCRIPTORS: ACHING;BURNING
DESCRIPTORS: ACHING
DESCRIPTORS: ACHING;BURNING

## 2019-08-15 NOTE — OP NOTE
was  carried out with fluoroscopy. Continuous O2 sat monitoring of the left  great toe was carried out. The abdomen was then prepped and draped in  the usual sterile manner. A left paramedian incision was made, and we  dissected through the subcutaneous tissues and the left anterior rectus  sheath, which was incised. The left rectus abdominus muscle was  mobilized from the midline towards the left side. Retroperitoneum was  entered in the left lower quadrant. Peritoneal contents were swept  towards the midline. A Syn-frame retractor was placed in the field. Bipolar cautery was used to free up the soft tissue lateral to the left  common iliac artery and vein. The left iliolumbar vein was ligated. Segmental vessels were clipped and ligated and we had good exposure of  the L4-L5 disk space. More proximal dissection was carried out and the  L3-L4 disk exposure was also done. Disk markers were placed at both  levels to confirm the level of the disk space as well as midline. Once  that was completed, diskectomy and cage placement were carried out by  Dr. Jennifer Medley at both levels. I then carefully inspected the wound for  hemostasis. Once hemostasis was ensured, I re-approximated the anterior  rectus sheath with 0 PDS loop sutures. Subcutaneous tissue was  re-approximated in two layers with 3-0 Vicryl. Skin was closed with 4-0  Monocryl. Fluoroscopy sweep confirmed no retained sponges or  instruments.         Isamar Chavez    D: 08/14/2019 14:02:28       T: 08/14/2019 21:34:06     [email or web address]  Job#: [id number]     Doc#: [id number]    CC:

## 2019-08-15 NOTE — PLAN OF CARE
Problem: Falls - Risk of:  Goal: Will remain free from falls  Description  Will remain free from falls  8/15/2019 0616 by Mike Abreu RN  Outcome: Ongoing  Patient remains free from falls during this shift. Patient is up x1 person assist with walker. Bed is in the lowest position and the bed and chair alarm is activated. Anti-slip socks are on. Call light is within reach. Will continue to monitor and reassess. Problem: Pain Control  Goal: Maintain pain level at or below patient's acceptable level (or 5 if patient is unable to determine acceptable level)  Outcome: Ongoing   RN assesses pain using 0-10 scale. Patient understands how to rate pain using 0-10 scale. Pain is controled with medication per MAR. RN encourages patient to call out for breakthrough pain. Will continue to monitor and reassess.

## 2019-08-15 NOTE — PLAN OF CARE
Problem: Falls - Risk of:  Goal: Will remain free from falls  Description  Will remain free from falls  Note:   Pt remains free from falls. Bed alarm on, will cont to monitor for safety.

## 2019-08-15 NOTE — PROGRESS NOTES
Up in chair for breakfast ,, brace on, gait steady , PCA stopped .  positives bowel sounds , tolerating solid food now, left leg pain is lessoned

## 2019-08-15 NOTE — CONSULTS
kg/m²   General appearance: No apparent distress, appears stated age and cooperative. HEENT: Normal cephalic, atraumatic without obvious deformity. Pupils equal, round, and reactive to light. Extra ocular muscles intact. Conjunctivae/corneas clear. Neck: Supple, with full range of motion. No jugular venous distention. Trachea midline. Respiratory:  Normal respiratory effort. Clear to auscultation, bilaterally without Rales/Wheezes/Rhonchi. Cardiovascular: Regular rate and rhythm with normal S1/S2 without murmurs, rubs or gallops. Abdomen: Soft, non-tender, non-distended with normal bowel sounds. Operative site clean dry intact  Musculoskeletal:  No clubbing, cyanosis or edema bilaterally. Skin: Skin color, texture, turgor normal.  No rashes or lesions. Neurologic:  Neurovascularly intact without any focal sensory/motor deficits. Cranial nerves: II-XII intact, grossly non-focal.  Psychiatric: Alert and oriented, thought content appropriate, normal insight  Capillary Refill: Brisk,< 3 seconds   Peripheral Pulses: +2 palpable, equal bilaterally     Labs:     No results for input(s): WBC, HGB, HCT, PLT in the last 72 hours. Recent Labs     08/14/19  1745   CREATININE 0.5*     No results for input(s): AST, ALT, BILIDIR, BILITOT, ALKPHOS in the last 72 hours. No results for input(s): INR in the last 72 hours. No results for input(s): Hailey Angelina in the last 72 hours. Urinalysis:  No results found for: Blackannabelle SnowPriscilla, BACTERIA, RBCUA, BLOODU, [address]    Radiology: I have reviewed the radiology reports with the following interpretation:     CT LUMBAR SPINE WO CONTRAST   Final Result      Postoperative changes related to fusion L3-L5 as above.       XR LUMBAR SPINE (2-3 VIEWS)   Final Result   See above      FLUORO FOR SURGICAL PROCEDURES   Final Result   See above             EKG:  I have reviewed the EKG with the following interpretation:    @ekgread@      ASSESSMENT:    Status post anterior lumbar L3-L4 L4-L5 discectomy and arthrodesis/laminotomy and foraminotomy      PLAN:    Patient is doing well postop day #1  Agree with oxycodone for pain  Pepcid for reflux  Medically she is doing quite well  Discharge planning for the next day or 2  Agree with changing Dilaudid as it seems that she is reacting poorly to this  She seems to have excellent analgesia with current regimen    Thanks for consult and will follow along with  Diet: DIET GENERAL;  Code Status: Full Code    PT/OT Eval Status: Active and ongoing    Dispo - per NSGY    Thank you for the consultation, will follow up as needed    Yvonne Logan MD

## 2019-08-16 VITALS
RESPIRATION RATE: 16 BRPM | HEIGHT: 61 IN | SYSTOLIC BLOOD PRESSURE: 114 MMHG | TEMPERATURE: 98.4 F | HEART RATE: 85 BPM | BODY MASS INDEX: 23.03 KG/M2 | OXYGEN SATURATION: 95 % | WEIGHT: 122 LBS | DIASTOLIC BLOOD PRESSURE: 66 MMHG

## 2019-08-16 PROBLEM — Z98.1 S/P LUMBAR SPINAL FUSION: Status: ACTIVE | Noted: 2019-08-16

## 2019-08-16 PROCEDURE — 97116 GAIT TRAINING THERAPY: CPT

## 2019-08-16 PROCEDURE — 6370000000 HC RX 637 (ALT 250 FOR IP): Performed by: NURSE PRACTITIONER

## 2019-08-16 PROCEDURE — 2580000003 HC RX 258: Performed by: NURSE PRACTITIONER

## 2019-08-16 PROCEDURE — 6360000002 HC RX W HCPCS: Performed by: NURSE PRACTITIONER

## 2019-08-16 PROCEDURE — 6370000000 HC RX 637 (ALT 250 FOR IP): Performed by: NEUROLOGICAL SURGERY

## 2019-08-16 PROCEDURE — 97530 THERAPEUTIC ACTIVITIES: CPT

## 2019-08-16 PROCEDURE — 2500000003 HC RX 250 WO HCPCS: Performed by: NURSE PRACTITIONER

## 2019-08-16 RX ORDER — PSEUDOEPHEDRINE HCL 30 MG
100 TABLET ORAL 2 TIMES DAILY
Qty: 30 CAPSULE | Refills: 0 | Status: SHIPPED | OUTPATIENT
Start: 2019-08-16 | End: 2020-10-30

## 2019-08-16 RX ORDER — METHOCARBAMOL 750 MG/1
750 TABLET, FILM COATED ORAL EVERY 6 HOURS PRN
Qty: 40 TABLET | Refills: 0 | Status: SHIPPED | OUTPATIENT
Start: 2019-08-16 | End: 2019-08-26

## 2019-08-16 RX ORDER — OXYCODONE HYDROCHLORIDE 5 MG/1
5 TABLET ORAL EVERY 6 HOURS PRN
Qty: 28 TABLET | Refills: 0 | Status: SHIPPED | OUTPATIENT
Start: 2019-08-16 | End: 2019-08-16 | Stop reason: HOSPADM

## 2019-08-16 RX ORDER — OXYCODONE HYDROCHLORIDE AND ACETAMINOPHEN 5; 325 MG/1; MG/1
1 TABLET ORAL EVERY 6 HOURS PRN
Qty: 28 TABLET | Refills: 0 | Status: SHIPPED | OUTPATIENT
Start: 2019-08-16 | End: 2019-08-23

## 2019-08-16 RX ORDER — METHOCARBAMOL 750 MG/1
750 TABLET, FILM COATED ORAL EVERY 6 HOURS PRN
Qty: 40 TABLET | Refills: 0 | Status: SHIPPED | OUTPATIENT
Start: 2019-08-16 | End: 2019-08-16

## 2019-08-16 RX ORDER — PSEUDOEPHEDRINE HCL 30 MG
100 TABLET ORAL 2 TIMES DAILY
Qty: 30 CAPSULE | Refills: 0 | Status: SHIPPED | OUTPATIENT
Start: 2019-08-16 | End: 2019-08-16

## 2019-08-16 RX ADMIN — OXYCODONE HYDROCHLORIDE AND ACETAMINOPHEN 2 TABLET: 5; 325 TABLET ORAL at 09:03

## 2019-08-16 RX ADMIN — FAMOTIDINE 20 MG: 10 INJECTION, SOLUTION INTRAVENOUS at 08:48

## 2019-08-16 RX ADMIN — OXYCODONE HYDROCHLORIDE AND ACETAMINOPHEN 2 TABLET: 5; 325 TABLET ORAL at 00:36

## 2019-08-16 RX ADMIN — OXYCODONE HYDROCHLORIDE AND ACETAMINOPHEN 2 TABLET: 5; 325 TABLET ORAL at 05:06

## 2019-08-16 RX ADMIN — ALPRAZOLAM 1 MG: 0.5 TABLET ORAL at 08:47

## 2019-08-16 RX ADMIN — METOCLOPRAMIDE 10 MG: 5 INJECTION, SOLUTION INTRAMUSCULAR; INTRAVENOUS at 00:37

## 2019-08-16 RX ADMIN — OXYCODONE HYDROCHLORIDE AND ACETAMINOPHEN 2 TABLET: 5; 325 TABLET ORAL at 13:11

## 2019-08-16 RX ADMIN — HYDROXYCHLOROQUINE SULFATE 200 MG: 200 TABLET, FILM COATED ORAL at 08:47

## 2019-08-16 RX ADMIN — Medication 10 ML: at 08:52

## 2019-08-16 RX ADMIN — DOCUSATE SODIUM 100 MG: 100 CAPSULE, LIQUID FILLED ORAL at 08:46

## 2019-08-16 RX ADMIN — ENOXAPARIN SODIUM 40 MG: 40 INJECTION SUBCUTANEOUS at 08:47

## 2019-08-16 ASSESSMENT — PAIN SCALES - GENERAL
PAINLEVEL_OUTOF10: 0
PAINLEVEL_OUTOF10: 4
PAINLEVEL_OUTOF10: 9
PAINLEVEL_OUTOF10: 0
PAINLEVEL_OUTOF10: 8

## 2019-08-16 ASSESSMENT — PAIN DESCRIPTION - LOCATION
LOCATION: BACK
LOCATION: BACK;ABDOMEN

## 2019-08-16 ASSESSMENT — PAIN DESCRIPTION - ORIENTATION
ORIENTATION: LOWER
ORIENTATION: LOWER

## 2019-08-16 ASSESSMENT — PAIN DESCRIPTION - ONSET
ONSET: ON-GOING
ONSET: ON-GOING

## 2019-08-16 ASSESSMENT — PAIN DESCRIPTION - DESCRIPTORS
DESCRIPTORS: ACHING;BURNING
DESCRIPTORS: ACHING

## 2019-08-16 ASSESSMENT — PAIN DESCRIPTION - FREQUENCY
FREQUENCY: CONTINUOUS
FREQUENCY: CONTINUOUS

## 2019-08-16 ASSESSMENT — PAIN DESCRIPTION - PAIN TYPE
TYPE: SURGICAL PAIN
TYPE: SURGICAL PAIN

## 2019-08-16 ASSESSMENT — PAIN DESCRIPTION - PROGRESSION
CLINICAL_PROGRESSION: NOT CHANGED
CLINICAL_PROGRESSION: GRADUALLY WORSENING

## 2019-08-16 ASSESSMENT — PAIN - FUNCTIONAL ASSESSMENT: PAIN_FUNCTIONAL_ASSESSMENT: PREVENTS OR INTERFERES SOME ACTIVE ACTIVITIES AND ADLS

## 2019-08-16 ASSESSMENT — PAIN DESCRIPTION - DIRECTION: RADIATING_TOWARDS: LEFT LEG PAIN

## 2019-08-16 NOTE — CARE COORDINATION
Case Management Assessment            Discharge Note                    Date / Time of Note: 8/16/2019 1:00 PM                  Discharge Note Completed by: Carola White    Patient Name: Mitchel Allan   YOB: 1955  Diagnosis: Lumbar stenosis with neurogenic claudication [M48.062]   Date / Time: 8/14/2019  5:25 AM    Current PCP: [address] patient: No    Hospitalization in the last 30 days: No    Advance Directives:  Code Status: Full Code  [place] DNR form completed and on chart: No    Financial:  Payor: MEDICARE / Plan: MEDICARE PART A AND B / Product Type: *No Product type* /      Pharmacy:  No Pharmacies [id number] medications?: Potential Assistance Purchasing Medications: No  Assistance provided by Case Management: None at this time    Does patient want to participate in local refill/ meds to beds program?: No    Meds To Beds General Rules:  1. Can ONLY be done Monday- Friday between 8:30am-5pm  2. Prescription(s) must be in pharmacy by 3pm to be filled same day  3. Copy of patient's insurance/ prescription drug card and patient face sheet must be sent along with the prescription(s)  4. Cost of Rx cannot be added to hospital bill. If financial assistance is needed, please contact unit  or ;  or  CANNOT provide pharmacy voucher for patients co-pays  5.  Patients can then  the prescription on their way out of the hospital at discharge, or pharmacy can deliver to the bedside if staff is available. (payment due at time of pick-up or delivery - cash, check, or card accepted)     Able to afford home medications/ co-pay costs: Yes    ADLS:  Current PT AM-PAC Score: 20 /24  Current OT AM-PAC Score: 21 /24      DISCHARGE Disposition: Home- No Services Needed    LOC at discharge: Not Applicable  KYLEIGH Completed: No    Notification completed in HENS/PAS?:  Not Applicable    IMM Completed:   Not

## 2019-08-16 NOTE — DISCHARGE SUMMARY
Discharge Summary    Date of Admission: 8/14/2019  5:25 AM  Date of Discharge: 8/16/19  Admission Diagnosis: LUMBAR STENOSIS  Discharge Diagnosis: Same   Condition on Discharge: good  Attending for Admission: Azalia Schlatter, MD  Procedures: Procedure(s) (LRB):  L3-4, L4-5 ANTERIOR LUMBAR INTERBODY FUSION FOLLOWED BY POSTERIOR PEDICLE SCREW FIXATION AND FUSION WITH LEFT L4-5 LAMINOFORAMINOTOMY (N/A)  SURGICAL SITE EXPOSURE/CLOSURE (N/A)  Consults: IP CONSULT TO CASE MANAGEMENT  IP CONSULT TO HOSPITALIST    Reason for Admission:  Bethany Man is a 61 y.o. female patient who was admitted to the hospital for complaints of back pain and leg pain. She underwent the procedure listed above on 8/14/19. Hospital Course:  After surgery, Her pre-operative back and leg pain was Absent . She complained of incisional pain. The pain was well-controlled on oral medications. Her brace was in place. The dressing was clean, dry and intact. There was no erythema or edema around the surgical site. Prior to discharge She was eating well, urinating and ambulating with a steady gait. Discharge Vitals/Labs:  /66   Pulse 85   Temp 98.4 °F (36.9 °C) (Oral)   Resp 16   Ht 5' 1\" (1.549 m)   Wt 122 lb (55.3 kg)   LMP  (LMP Unknown)   SpO2 95%   BMI 23.05 kg/m²   CBC: No results found for: WBC, RBC, HGB, HCT, MCV, MCH, MCHC, RDW, PLT, MPV  CMP:    Lab Results   Component Value Date    CREATININE 0.5 08/14/2019    GFRAA >60 08/14/2019    LABGLOM >60 08/14/2019       Discharge Medications: The patient suffers from a major neurological surgery that pain cannot be managed within an average of 30 MED per day. Severe acute postoperative pain is the reason for exceeding the 30 MED average, and the prescription reflects the same dosage patient received while inpatient, which is the lowest dose consistent with the patients medical condition.  Non-opioid treatment options have been considered prior to prescribing opioids, and the xrays.     Discharge Instructions:   Verbal and written discharge instructions were given to the patient . No NSAIDS or anticoagulation for 1 week post-operatively. No lifting or bending. Electronically signed by:  YANETH Villalta, 8/16/2019 12:40 PM  [phone]

## 2019-08-16 NOTE — PROGRESS NOTES
Patient is alert and oriented. Vital signs are stable. Patient's pain is controlled with pain medication per MAR. Patient ambulates x1 assist. Bed is in the lowest position. Bed alarm is activated. Call light is within reach. Will continue to monitor and reassess.

## 2019-08-16 NOTE — PROGRESS NOTES
NEUROSURGERY POST-OP PROGRESS NOTE    Patient Name: Wilfrido Love YOB: 1955   Sex: Female Age: 61 yrs     Medical Record Number: [id number] Acct Number: [de-identified]   Room Number: 4656/7262-24 Hospital Day: Hospital Day: 3     Interval History:  Post-operative Day# 2 s/p Procedure(s) (LRB):  L3-4, L4-5 ANTERIOR LUMBAR INTERBODY FUSION FOLLOWED BY POSTERIOR PEDICLE SCREW FIXATION AND FUSION WITH LEFT L4-5 LAMINOFORAMINOTOMY (N/A)  SURGICAL SITE EXPOSURE/CLOSURE (N/A)    Subjective: PAtient is sitting up and feels much better    Objective:    VITAL SIGNS   /66   Pulse 85   Temp 98.4 °F (36.9 °C) (Oral)   Resp 16   Ht 5' 1\" (1.549 m)   Wt 122 lb (55.3 kg)   LMP  (LMP Unknown)   SpO2 95%   BMI 23.05 kg/m²    Height Height: 5' 1\" (154.9 cm)   Weight Weight: 122 lb (55.3 kg)        Allergies Allergies   Allergen Reactions    Gabapentin      LYRICA CYMBALTA  SUICIDIAL IDEATIONS    Hydrocodone-Acetaminophen      Burning    Morphine Hives     Other reaction(s): Other (See Comments)  Arm got red and swollen      Tizanidine      Nightmares      NPO Status DIET GENERAL;   Isolation No active isolations     LABS   Basic Metabolic Profile Recent Labs     08/14/19  1745   CREATININE 0.5*      Complete Blood Count No results for input(s): WBC, RBC, HEMOGLOBIN in the last 72 hours. Invalid input(s): HEMATOCRIT   Coagulation Studies No results for input(s): PTT, INR in the last 72 hours.     Invalid input(s): PLATELETS, PROA, PT, PTTA     MEDICATIONS   Inpatient Medications   ALPRAZolam, 1 mg, Oral, BID    lidocaine, 1 patch, Topical, Daily    sodium chloride flush, 10 mL, Intravenous, 2 times per day    famotidine (PEPCID) injection, 20 mg, Intravenous, BID    docusate sodium, 100 mg, Oral, BID    enoxaparin, 40 mg, Subcutaneous, Daily    hydroxychloroquine, 200 mg, Oral, Once per day on Mon Tue Wed Thu    hydroxychloroquine, 200 mg, Oral, 2 times per day on Sun Fri Sat   Infusions   

## 2019-11-22 ENCOUNTER — HOSPITAL ENCOUNTER (OUTPATIENT)
Dept: MAMMOGRAPHY | Age: 64
Discharge: HOME | End: 2019-11-27
Payer: MEDICARE

## 2019-11-22 DIAGNOSIS — Z12.31 VISIT FOR SCREENING MAMMOGRAM: ICD-10-CM

## 2019-11-22 PROCEDURE — 77063 BREAST TOMOSYNTHESIS BI: CPT

## 2020-10-30 RX ORDER — OXYCODONE HYDROCHLORIDE AND ACETAMINOPHEN 5; 325 MG/1; MG/1
1 TABLET ORAL EVERY 4 HOURS PRN
COMMUNITY

## 2020-10-30 RX ORDER — BACLOFEN 10 MG/1
10 TABLET ORAL 3 TIMES DAILY
COMMUNITY

## 2020-10-30 RX ORDER — LISINOPRIL 2.5 MG/1
2.5 TABLET ORAL DAILY
COMMUNITY

## 2020-10-30 RX ORDER — MULTIVIT-MIN/IRON/FOLIC ACID/K 18-600-40
CAPSULE ORAL
COMMUNITY

## 2020-10-30 RX ORDER — NAPROXEN 125 MG/5ML
SUSPENSION ORAL 2 TIMES DAILY
COMMUNITY

## 2020-10-30 RX ORDER — ASPIRIN 81 MG/1
81 TABLET ORAL DAILY
COMMUNITY

## 2020-10-30 RX ORDER — CHLORAL HYDRATE 500 MG
3000 CAPSULE ORAL 3 TIMES DAILY
COMMUNITY

## 2020-10-30 NOTE — PROGRESS NOTES
[address] [facility] time___0615_________        Surgery time_____0730_______    Take the following medications with a sip of water:  Lisinopril, albuterol inhaler if needed and bring DOS    Do not eat or drink anything after 12:00 midnight prior to your surgery. This includes water chewing gum, mints and ice chips. You may brush your teeth and gargle the morning of your surgery, but do not swallow the water     Please see your family doctor/pediatrician for a history and physical and/or concerning medications. H&P 11/3 Dr. Belen Joseph    Bring any test results/reports from your physicians office. If you are under the care of a heart doctor or specialist doctor, please be aware that you may be asked to them for clearance    You may be asked to stop blood thinners such as Coumadin, Plavix, Fragmin, Lovenox, etc., or any anti-inflammatories such as:  Aspirin, Ibuprofen, Advil, Naproxen prior to your surgery. We also ask that you stop any OTC medications such as fish oil, vitamin E, glucosamine, garlic, Multivitamins, COQ 10, etc.    We ask that you do not smoke 24 hours prior to surgery  We ask that you do not  drink any alcoholic beverages 24 hours prior to surgery     You must make arrangements for a responsible adult to take you home after your surgery. For your safety you will not be allowed to leave alone or drive yourself home. Your surgery will be cancelled if you do not have a ride home. Also for your safety, it is strongly suggested that someone stay with you the first 24 hours after your surgery. A parent or legal guardian must accompany a child scheduled for surgery and plan to stay at the hospital until the child is discharged. Please do not bring other children with you. For your comfort, please wear simple loose fitting clothing to the hospital.  Please do not bring valuables. Wear short sleeve button down shirt or loose fitting shirt.  Bring eye drops or ointment. Do not wear any make-up or nail polish on your fingers or toes      For your safety, please do not wear any jewelry or body piercing's on the day of surgery. All jewelry must be removed. If you have dentures, they will be removed before going to operating room. For your convenience, we will provide you with a container. If you wear contact lenses or glasses, they will be removed, please bring a case for them. If you have a living will and a durable power of  for healthcare, please bring in a copy. As part of our patient safety program to minimize surgical site infections, we ask you to do the following:    · Please notify your surgeon if you develop any illness between         now and the  day of your surgery. · This includes a cough, cold, fever, sore throat, nausea,         or vomiting, and diarrhea, etc.  ·  Please notify your surgeon if you experience dizziness, shortness         of breath or blurred vision between now and the time of your surgery. Do not shave your operative site 96 hours prior to surgery. For face and neck surgery, men may use an electric razor 48 hours   prior to surgery. You may shower the night before surgery or the morning of   your surgery with an antibacterial soap. You will need to bring a photo ID and insurance card    [facility] has an onsite pharmacy, would you like to utilize our pharmacy     If you will be staying overnight and use a C-pap machine, please bring   your C-pap to hospital     Our goal is to provide you with excellent care, therefore, visitors will be limited to two(2) in the room at a time so that we may focus on providing this care for you. Please contact pre-admission testing if you have any further questions.                  [facility] phone number:  [phone]  Please note these are generalized instructions for all surgical cases, you may be provided with more specific instructions according to your surgery.

## 2020-11-06 ENCOUNTER — ANESTHESIA EVENT (OUTPATIENT)
Dept: SURGERY | Age: 65
End: 2020-11-06
Payer: MEDICARE

## 2020-11-06 NOTE — PROGRESS NOTES
[address] [facility]  [phone]        Pre-Op Phone Call:     Patient Name: Dayo Horton     No relevant phone numbers on file. Home phone:  [phone]    Surgery Time:    7:30 AM     Arrival Time:  0615     Left extended Message:  NA     Message left with:     Recent change in health status:  No     Advised of transportation/ policy:  Yes     NPO policy reviewed:  Yes pt     Advised to take morning heart/blood pressure medications with sips of water morning of surgery? Yes     Instructed to bring eye drops, photo identification, and insurance card day of surgery? Yes     Advised to wear short sleeved button down shirt (no T-shirt underneath):  Yes     Advised not to wear jewelry, hairpins, or pantyhose day of surgery? Yes     Advised not to wear make-up and to wash face day of surgery?   Yes    Remarks:        Electronically signed by:  Reyes Damon RN at 11/6/2020 11:22 AM

## 2020-11-09 ENCOUNTER — ANESTHESIA (OUTPATIENT)
Dept: SURGERY | Age: 65
End: 2020-11-09
Payer: MEDICARE

## 2020-11-09 ENCOUNTER — HOSPITAL ENCOUNTER (OUTPATIENT)
Age: 65
Setting detail: OUTPATIENT SURGERY
Discharge: HOME | End: 2020-11-09
Attending: OPHTHALMOLOGY | Admitting: OPHTHALMOLOGY
Payer: MEDICARE

## 2020-11-09 VITALS — OXYGEN SATURATION: 100 % | DIASTOLIC BLOOD PRESSURE: 69 MMHG | SYSTOLIC BLOOD PRESSURE: 135 MMHG

## 2020-11-09 VITALS
HEART RATE: 65 BPM | HEIGHT: 61 IN | BODY MASS INDEX: 23.98 KG/M2 | DIASTOLIC BLOOD PRESSURE: 50 MMHG | WEIGHT: 127 LBS | OXYGEN SATURATION: 100 % | TEMPERATURE: 97 F | SYSTOLIC BLOOD PRESSURE: 112 MMHG | RESPIRATION RATE: 12 BRPM

## 2020-11-09 PROCEDURE — 2709999900 HC NON-CHARGEABLE SUPPLY: Performed by: OPHTHALMOLOGY

## 2020-11-09 PROCEDURE — 3700000000 HC ANESTHESIA ATTENDED CARE: Performed by: OPHTHALMOLOGY

## 2020-11-09 PROCEDURE — 7100000010 HC PHASE II RECOVERY - FIRST 15 MIN: Performed by: OPHTHALMOLOGY

## 2020-11-09 PROCEDURE — 6370000000 HC RX 637 (ALT 250 FOR IP): Performed by: OPHTHALMOLOGY

## 2020-11-09 PROCEDURE — 3600000014 HC SURGERY LEVEL 4 ADDTL 15MIN: Performed by: OPHTHALMOLOGY

## 2020-11-09 PROCEDURE — 2580000003 HC RX 258: Performed by: ANESTHESIOLOGY

## 2020-11-09 PROCEDURE — V2632 POST CHMBR INTRAOCULAR LENS: HCPCS | Performed by: OPHTHALMOLOGY

## 2020-11-09 PROCEDURE — 6360000002 HC RX W HCPCS: Performed by: NURSE ANESTHETIST, CERTIFIED REGISTERED

## 2020-11-09 PROCEDURE — 3600000004 HC SURGERY LEVEL 4 BASE: Performed by: OPHTHALMOLOGY

## 2020-11-09 PROCEDURE — 3700000001 HC ADD 15 MINUTES (ANESTHESIA): Performed by: OPHTHALMOLOGY

## 2020-11-09 PROCEDURE — 2500000003 HC RX 250 WO HCPCS: Performed by: OPHTHALMOLOGY

## 2020-11-09 DEVICE — LENS INTOCU +22.0 DIOPT L13MM DIA6MM A CONSTANT 118.7 0DEG: Type: IMPLANTABLE DEVICE | Status: FUNCTIONAL

## 2020-11-09 RX ORDER — LIDOCAINE HYDROCHLORIDE 10 MG/ML
INJECTION, SOLUTION EPIDURAL; INFILTRATION; INTRACAUDAL; PERINEURAL PRN
Status: DISCONTINUED | OUTPATIENT
Start: 2020-11-09 | End: 2020-11-09 | Stop reason: ALTCHOICE

## 2020-11-09 RX ORDER — TETRACAINE HYDROCHLORIDE 5 MG/ML
SOLUTION OPHTHALMIC PRN
Status: DISCONTINUED | OUTPATIENT
Start: 2020-11-09 | End: 2020-11-09 | Stop reason: ALTCHOICE

## 2020-11-09 RX ORDER — CIPROFLOXACIN HYDROCHLORIDE 3.5 MG/ML
1 SOLUTION/ DROPS TOPICAL SEE ADMIN INSTRUCTIONS
Status: DISCONTINUED | OUTPATIENT
Start: 2020-11-09 | End: 2020-11-09 | Stop reason: HOSPADM

## 2020-11-09 RX ORDER — PHENYLEPHRINE HCL 2.5 %
1 DROPS OPHTHALMIC (EYE) SEE ADMIN INSTRUCTIONS
Status: DISCONTINUED | OUTPATIENT
Start: 2020-11-09 | End: 2020-11-09 | Stop reason: HOSPADM

## 2020-11-09 RX ORDER — SODIUM CHLORIDE 0.9 % (FLUSH) 0.9 %
10 SYRINGE (ML) INJECTION PRN
Status: DISCONTINUED | OUTPATIENT
Start: 2020-11-09 | End: 2020-11-09 | Stop reason: HOSPADM

## 2020-11-09 RX ORDER — BRIMONIDINE TARTRATE 0.15 %
DROPS OPHTHALMIC (EYE) PRN
Status: DISCONTINUED | OUTPATIENT
Start: 2020-11-09 | End: 2020-11-09 | Stop reason: ALTCHOICE

## 2020-11-09 RX ORDER — SODIUM CHLORIDE 9 MG/ML
INJECTION, SOLUTION INTRAVENOUS CONTINUOUS
Status: DISCONTINUED | OUTPATIENT
Start: 2020-11-09 | End: 2020-11-09 | Stop reason: HOSPADM

## 2020-11-09 RX ORDER — KETOROLAC TROMETHAMINE 5 MG/ML
1 SOLUTION OPHTHALMIC SEE ADMIN INSTRUCTIONS
Status: DISCONTINUED | OUTPATIENT
Start: 2020-11-09 | End: 2020-11-09 | Stop reason: HOSPADM

## 2020-11-09 RX ORDER — SODIUM CHLORIDE 0.9 % (FLUSH) 0.9 %
10 SYRINGE (ML) INJECTION EVERY 12 HOURS SCHEDULED
Status: DISCONTINUED | OUTPATIENT
Start: 2020-11-09 | End: 2020-11-09 | Stop reason: HOSPADM

## 2020-11-09 RX ORDER — MIDAZOLAM HYDROCHLORIDE 1 MG/ML
INJECTION INTRAMUSCULAR; INTRAVENOUS PRN
Status: DISCONTINUED | OUTPATIENT
Start: 2020-11-09 | End: 2020-11-09 | Stop reason: SDUPTHER

## 2020-11-09 RX ORDER — TETRACAINE HYDROCHLORIDE 5 MG/ML
1 SOLUTION OPHTHALMIC ONCE
Status: COMPLETED | OUTPATIENT
Start: 2020-11-09 | End: 2020-11-09

## 2020-11-09 RX ORDER — CYCLOPENTOLATE HYDROCHLORIDE 20 MG/ML
1 SOLUTION/ DROPS OPHTHALMIC SEE ADMIN INSTRUCTIONS
Status: DISCONTINUED | OUTPATIENT
Start: 2020-11-09 | End: 2020-11-09 | Stop reason: HOSPADM

## 2020-11-09 RX ORDER — SODIUM CHLORIDE 0.9 % (FLUSH) 0.9 %
10 SYRINGE (ML) INJECTION PRN
Status: DISCONTINUED | OUTPATIENT
Start: 2020-11-09 | End: 2020-11-09 | Stop reason: SDUPTHER

## 2020-11-09 RX ORDER — TROPICAMIDE 10 MG/ML
1 SOLUTION/ DROPS OPHTHALMIC SEE ADMIN INSTRUCTIONS
Status: DISCONTINUED | OUTPATIENT
Start: 2020-11-09 | End: 2020-11-09 | Stop reason: HOSPADM

## 2020-11-09 RX ORDER — SODIUM CHLORIDE 0.9 % (FLUSH) 0.9 %
10 SYRINGE (ML) INJECTION EVERY 12 HOURS SCHEDULED
Status: DISCONTINUED | OUTPATIENT
Start: 2020-11-09 | End: 2020-11-09 | Stop reason: SDUPTHER

## 2020-11-09 RX ORDER — BALANCED SALT SOLUTION 6.4; .75; .48; .3; 3.9; 1.7 MG/ML; MG/ML; MG/ML; MG/ML; MG/ML; MG/ML
SOLUTION OPHTHALMIC PRN
Status: DISCONTINUED | OUTPATIENT
Start: 2020-11-09 | End: 2020-11-09 | Stop reason: ALTCHOICE

## 2020-11-09 RX ADMIN — CYCLOPENTOLATE HYDROCHLORIDE 1 DROP: 20 SOLUTION/ DROPS OPHTHALMIC at 06:45

## 2020-11-09 RX ADMIN — LIDOCAINE HYDROCHLORIDE: 35 GEL OPHTHALMIC at 06:50

## 2020-11-09 RX ADMIN — TROPICAMIDE 1 DROP: 10 SOLUTION/ DROPS OPHTHALMIC at 06:50

## 2020-11-09 RX ADMIN — POVIDONE-IODINE: 5 SOLUTION OPHTHALMIC at 06:45

## 2020-11-09 RX ADMIN — PHENYLEPHRINE HYDROCHLORIDE 1 DROP: 25 SOLUTION/ DROPS OPHTHALMIC at 06:45

## 2020-11-09 RX ADMIN — CIPROFLOXACIN 1 DROP: 3 SOLUTION OPHTHALMIC at 06:45

## 2020-11-09 RX ADMIN — MIDAZOLAM 1 MG: 1 INJECTION INTRAMUSCULAR; INTRAVENOUS at 07:34

## 2020-11-09 RX ADMIN — KETOROLAC TROMETHAMINE 1 DROP: 5 SOLUTION/ DROPS OPHTHALMIC at 06:50

## 2020-11-09 RX ADMIN — TETRACAINE HYDROCHLORIDE 1 DROP: 5 SOLUTION OPHTHALMIC at 06:45

## 2020-11-09 RX ADMIN — SODIUM CHLORIDE: 9 INJECTION, SOLUTION INTRAVENOUS at 07:31

## 2020-11-09 RX ADMIN — KETOROLAC TROMETHAMINE 1 DROP: 5 SOLUTION/ DROPS OPHTHALMIC at 06:45

## 2020-11-09 RX ADMIN — PHENYLEPHRINE HYDROCHLORIDE 1 DROP: 25 SOLUTION/ DROPS OPHTHALMIC at 06:50

## 2020-11-09 RX ADMIN — TROPICAMIDE 1 DROP: 10 SOLUTION/ DROPS OPHTHALMIC at 06:45

## 2020-11-09 RX ADMIN — MIDAZOLAM 1 MG: 1 INJECTION INTRAMUSCULAR; INTRAVENOUS at 07:31

## 2020-11-09 RX ADMIN — LIDOCAINE HYDROCHLORIDE: 35 GEL OPHTHALMIC at 06:45

## 2020-11-09 RX ADMIN — CIPROFLOXACIN 1 DROP: 3 SOLUTION OPHTHALMIC at 06:50

## 2020-11-09 ASSESSMENT — LIFESTYLE VARIABLES: SMOKING_STATUS: 0

## 2020-11-09 ASSESSMENT — PAIN SCALES - GENERAL
PAINLEVEL_OUTOF10: 0
PAINLEVEL_OUTOF10: 0

## 2020-11-09 ASSESSMENT — PAIN - FUNCTIONAL ASSESSMENT: PAIN_FUNCTIONAL_ASSESSMENT: 0-10

## 2020-11-09 NOTE — H&P
Date of Surgery Update:  Jeovany Charles was seen, history and physical examination reviewed, and patient examined by me today. There have been no significant clinical changes since the completion of the previous history and physical. The surgical site was confirmed by the patient and me. The risk, benefits, and alternatives of the proposed procedure have been explained to the patient (or appropriate guardian) and understanding verbalized. All questions answered. Patient wishes to proceed.     Electronically signed by: Isabel Falk MD,11/9/2020,9:09 AM

## 2020-11-09 NOTE — PROGRESS NOTES
Pt known to be very difficult IV stick, states \"they usually can only get my port. \"  Maya Pichardo RN attempted in R arm x2 , unsuccessful. Pt tolerated well, stated it was okay to try again. Tricia Musa, charge RN attempted in [facility], unsuccessful. Pt tolerated well again, stated it was okay to try again. Tricia Musa RN was able to obtain IV access in R wrist.  IVF infusing. Pt resting in bed. Call light within reach, will continue to monitor.

## 2020-11-09 NOTE — OP NOTE
[place] [address]  [phone]      11/9/2020    Patient name: Karina Aguirre  YOB: 1955  MRN: [id number]    Allergies: Allergies   Allergen Reactions    Gabapentin      LYRICA CYMBALTA  SUICIDIAL IDEATIONS    Hydrocodone-Acetaminophen      Burning    Morphine Hives     Other reaction(s): Other (See Comments)  Arm got red and swollen      Tizanidine      Nightmares       Pre-operative diagnosis:  Cataract Right Eye    Post-operative diagnosis:  Same    Procedure Performed:  Phacoemulsification with insertion of a foldable posterior chamber intraocular lens implant to the right eye. Anesthesia:  Topical Anesthesia with MAC. Estimated Blood Loss: None    Specimens removed: None    Limbal Relaxing Incisions:  Axis:N/A    Arc Length: N/A    Complications:  None    Description of Procedure: In the same day surgery center, the patient was given the usual pre-operative regimen. In the operating room suite, the right eye was prepped and draped in the usual sterile fashion. A paracentesis tract was fashioned, after which 0.5 MI of 1% preservative free Lidocaine was injected into the anterior chamber followed by Viscoat for a complete chamber fill. A clear cornea temporal tunnel was created using a keratome. Under optimal magnification and visualization, a continuous curvilinear capsulorrhexis was performed. Hydro-dissection of the lens was carried out using balanced salt solution. The lens was then phacoemulsified using the divide and conquer technique. Residual cortex was removed using the I/A handpiece followed by thorough posterior capsule polishing. The capsular bag was then filled with Provisc and the lens was gently delivered into the bag, achieving excellent centration. Provisc was removed using the I/A handpiece and the globe was pressurized using balanced salt solution.  The paracentesis tract and the temporal wound were both checked and found to be watertight. The speculum was removed and Betadine 5% was instilled. The patient tolerated the procedure well and was taken to the same day surgery suite in stable condition. Post-operative instructions and follow-up care were arranged.        Electronically signed by: Evy Hercules MD,11/9/2020,9:10 AM

## 2020-11-09 NOTE — ANESTHESIA POSTPROCEDURE EVALUATION
Department of Anesthesiology  Postprocedure Note    Patient: Mely Garcia  MRN: [id number]  YOB: 1955  Date of evaluation: 11/9/2020  Time:  10:54 AM     Procedure Summary     Date:  11/09/20 Room / Location:  [address]    Anesthesia Start:  0725 Anesthesia Stop:  Donelda Cousins    Procedure:  RIGHT EYE Phacoemulsification with intraocular lens implant (Right ) Diagnosis:       Nuclear sclerosis, right      (Nuclear sclerosis, right)    Surgeon:  Genoveva Burks MD Responsible Provider:  Christine Adames MD    Anesthesia Type:  MAC ASA Status:  3          Anesthesia Type: MAC    Haider Phase I: Haider Score: 10    Haider Phase II: Haider Score: 10    Last vitals: Reviewed and per EMR flowsheets.        Anesthesia Post Evaluation    Patient location during evaluation: PACU  Patient participation: complete - patient participated  Level of consciousness: awake and alert  Pain score: 0  Airway patency: patent  Nausea & Vomiting: no nausea and no vomiting  Complications: no  Cardiovascular status: blood pressure returned to baseline  Respiratory status: acceptable  Hydration status: euvolemic

## 2020-11-09 NOTE — ANESTHESIA PRE PROCEDURE
Department of Anesthesiology  Preprocedure Note       Name:  Lou Vitale   Age:  72 y.o.  :  1955                                          MRN:  [id number]         Date:  2020      Surgeon: Melissa Randle):  Norberto Bravo MD    Procedure: RIGHT EYE Phacoemulsification with intraocular lens implant (Right )    Medications prior to admission:   Prior to Admission medications    Medication Sig Start Date End Date Taking? Authorizing Provider   naproxen (NAPROSYN) 125 MG/5ML suspension Take by mouth 2 times daily    Historical Provider, MD   lisinopril (PRINIVIL;ZESTRIL) 2.5 MG tablet Take 2.5 mg by mouth daily    Historical Provider, MD   aspirin 81 MG EC tablet Take 81 mg by mouth daily    Historical Provider, MD   Cholecalciferol (VITAMIN D) 50 MCG (2000) CAPS capsule Take by mouth    Historical Provider, MD   Omega-3 Fatty Acids (FISH OIL) 1000 MG CAPS Take 3,000 mg by mouth 3 times daily    Historical Provider, MD   baclofen (LIORESAL) 10 MG tablet Take 10 mg by mouth 3 times daily    Historical Provider, MD   oxyCODONE-acetaminophen (PERCOCET) 5-325 MG per tablet Take 1 tablet by mouth every 4 hours as needed for Pain. Historical Provider, MD   Azelastine-Fluticasone 137-50 MCG/ACT SUSP 1 spray by Nasal route 10/23/18   Historical Provider, MD   ALPRAZolam Lauren Corado) 1 MG tablet Take 1 mg by mouth 2 times daily.      Historical Provider, MD   cycloSPORINE (RESTASIS) 0.05 % ophthalmic emulsion Apply to eye 19   Historical Provider, MD   hydroxychloroquine (PLAQUENIL) 200 MG tablet 1 QD -Thursday and 1 BID Fri-Sun 6/6/18   Historical Provider, MD   lidocaine (LIDODERM) 5 % Place 1 patch onto the skin 19   Historical Provider, MD   predniSONE (DELTASONE) 5 MG tablet Take 10 mg by mouth daily as needed  18   Historical Provider, MD   albuterol sulfate  (90 Base) MCG/ACT inhaler Inhale 2 puffs into the lungs every 6 hours as needed for Wheezing    Historical Provider, MD Current medications:    No current facility-administered medications for this visit. No current outpatient medications on file. Facility-Administered Medications Ordered in Other Visits   Medication Dose Route Frequency Provider Last Rate Last Dose    0.9 % sodium chloride infusion   Intravenous Continuous Abigail Jones MD        sodium chloride flush 0.9 % injection 10 mL  10 mL Intravenous 2 times per day Abigail Jones MD        sodium chloride flush 0.9 % injection 10 mL  10 mL Intravenous PRN Abigail Jones MD        povidone-iodine 5 % ophthalmic solution   Right Eye Once Aleisha Martinez MD        ciprofloxacin (CILOXAN) 0.3 % ophthalmic solution 1 drop  1 drop Right Eye See [address], [place]        lidocaine (AKTEN) 3.5 % ophthalmic gel   Right Eye See [address], MD        tetracaine (TETRAVISC) 0.5 % ophthalmic solution 1 drop  1 drop Right Eye Once MD Jeffrey        cyclopentolate (CYCLOGYL) 1 % ophthalmic solution 1 drop  1 drop Right Eye See [address], MD        phenylephrine (MYDFRIN) 2.5 % ophthalmic solution 1 drop  1 drop Right Eye See [address], MD        tropicamide (MYDRIACYL) 1 % ophthalmic solution 1 drop  1 drop Right Eye See [address], MD        ketorolac (ACULAR) 0.5 % ophthalmic solution 1 drop  1 drop Right Eye See [address], [place]           Allergies: Allergies   Allergen Reactions    Gabapentin      LYRICA CYMBALTA  SUICIDIAL IDEATIONS    Hydrocodone-Acetaminophen      Burning    Morphine Hives     Other reaction(s):  Other (See Comments)  Arm got red and swollen      Tizanidine      Nightmares       Problem List:    Patient Active Problem List   Diagnosis Code    Lumbar stenosis with neurogenic claudication M48.062    S/P lumbar spinal fusion Z98.1       Past Medical History: Diagnosis Date    Arthritis     RHUE    Back disorder     Brain aneurysm     Carpal tunnel syndrome     GERD (gastroesophageal reflux disease)     Hepatitis C     treated     Hypertension     Osteoporosis     Seasonal allergies        Past Surgical History:        Procedure Laterality Date    BRAIN ANEURYSM SURGERY  2020    COLONOSCOPY      LUMBAR FUSION N/A 2019    L3-4, L4-5 ANTERIOR LUMBAR INTERBODY FUSION FOLLOWED BY POSTERIOR PEDICLE SCREW FIXATION AND FUSION WITH LEFT L4-5 LAMINOFORAMINOTOMY performed by Deloris Chairez MD at [address] TUNNELED VENOUS PORT PLACEMENT         Social History:    Social History     Tobacco Use    Smoking status: Former Smoker     Last attempt to quit: 1994     Years since quittin.2    Smokeless tobacco: Never Used   Substance Use Topics    Alcohol use: Not Currently                                Counseling given: Not Answered      Vital Signs (Current): There were no vitals filed for this visit. BP Readings from Last 3 Encounters:   20 124/70   19 114/66   19 137/76       NPO Status:  >8h                                                                               BMI:   Wt Readings from Last 3 Encounters:   20 127 lb (57.6 kg)   19 122 lb (55.3 kg)     There is no height or weight on file to calculate BMI.    CBC: No results found for: WBC, RBC, HGB, HCT, MCV, RDW, PLT    CMP:   Lab Results   Component Value Date    CREATININE 0.5 2019    GFRAA >60 2019    LABGLOM >60 2019       POC Tests: No results for input(s): POCGLU, POCNA, POCK, POCCL, POCBUN, POCHEMO, POCHCT in the last 72 hours.     Coags: No results found for: PROTIME, INR, APTT    HCG (If Applicable): No results found for: PREGTESTUR, PREGSERUM, HCG, HCGQUANT     ABGs: No results found for: PHART, PO2ART, QBF9FZX, MSS3XDR, BEART, G1LVLJLK     Type & Screen (If Applicable):  No results found for: LABABO, [address]    Anesthesia Evaluation  Patient summary reviewed and Nursing notes reviewed no history of anesthetic complications:   Airway: Mallampati: II  TM distance: >3 FB   Neck ROM: full  Mouth opening: > = 3 FB Dental:    (+) upper dentures and lower dentures      Pulmonary:normal exam  breath sounds clear to auscultation      (-) COPD, asthma, recent URI, sleep apnea and not a current smoker                           Cardiovascular:  Exercise tolerance: good (>4 METS),   (+) hypertension:,     (-) past MI, CABG/stent and  angina      Rhythm: regular  Rate: normal                    Neuro/Psych:      (-) seizures, TIA and CVA            ROS comment: Lumbar stenosis, cerebral aneurysm GI/Hepatic/Renal:   (+) GERD: well controlled, hepatitis: C, liver disease:,      (-) hiatal hernia and no renal disease      ROS comment: Treated for Hep C.   Endo/Other:    (+) : arthritis: rheumatoid. , .    (-) diabetes mellitus, hypothyroidism                ROS comment: Osteoporosis Abdominal:           Vascular:     - DVT and PE. Anesthesia Plan      MAC     ASA 3       Induction: intravenous. Anesthetic plan and risks discussed with patient. Plan discussed with CRNA.                   Socrates García MD   11/9/2020

## 2020-11-09 NOTE — PROGRESS NOTES
Pt took PO percocet this AM with sip of water around 0400, states she has chronic back pain. See home med list.  Pt also has lidocaine patch on back currently. Pt states pain is controlled at this time, no complaints.

## 2020-11-16 NOTE — PROGRESS NOTES
[address] [facility] time___0640_________        Surgery time_____0810_______    Take the following medications with a sip of water: Lisinopril prednisone albuterol     Do not eat or drink anything after 12:00 midnight prior to your surgery. This includes water chewing gum, mints and ice chips. You may brush your teeth and gargle the morning of your surgery, but do not swallow the water     Please see your family doctor/pediatrician for a history and physical and/or concerning medications. Bring any test results/reports from your physicians office. If you are under the care of a heart doctor or specialist doctor, please be aware that you may be asked to them for clearance    You may be asked to stop blood thinners such as Coumadin, Plavix, Fragmin, Lovenox, etc., or any anti-inflammatories such as:  Aspirin, Ibuprofen, Advil, Naproxen prior to your surgery. We also ask that you stop any OTC medications such as fish oil, vitamin E, glucosamine, garlic, Multivitamins, COQ 10, etc.    We ask that you do not smoke 24 hours prior to surgery  We ask that you do not  drink any alcoholic beverages 24 hours prior to surgery     You must make arrangements for a responsible adult to take you home after your surgery. For your safety you will not be allowed to leave alone or drive yourself home. Your surgery will be cancelled if you do not have a ride home. Also for your safety, it is strongly suggested that someone stay with you the first 24 hours after your surgery. A parent or legal guardian must accompany a child scheduled for surgery and plan to stay at the hospital until the child is discharged. Please do not bring other children with you. For your comfort, please wear simple loose fitting clothing to the hospital.  Please do not bring valuables. Wear short sleeve button down shirt or loose fitting shirt. Bring eye drops or ointment.     Do not wear any make-up or nail polish on your fingers or toes      For your safety, please do not wear any jewelry or body piercing's on the day of surgery. All jewelry must be removed. If you have dentures, they will be removed before going to operating room. For your convenience, we will provide you with a container. If you wear contact lenses or glasses, they will be removed, please bring a case for them. If you have a living will and a durable power of  for healthcare, please bring in a copy. As part of our patient safety program to minimize surgical site infections, we ask you to do the following:    · Please notify your surgeon if you develop any illness between         now and the  day of your surgery. · This includes a cough, cold, fever, sore throat, nausea,         or vomiting, and diarrhea, etc.  ·  Please notify your surgeon if you experience dizziness, shortness         of breath or blurred vision between now and the time of your surgery. Do not shave your operative site 96 hours prior to surgery. For face and neck surgery, men may use an electric razor 48 hours   prior to surgery. You may shower the night before surgery or the morning of   your surgery with an antibacterial soap. You will need to bring a photo ID and insurance card    [facility] has an onsite pharmacy, would you like to utilize our pharmacy     If you will be staying overnight and use a C-pap machine, please bring   your C-pap to hospital     Our goal is to provide you with excellent care, therefore, visitors will be limited to two(2) in the room at a time so that we may focus on providing this care for you. Please contact pre-admission testing if you have any further questions. [facility] phone number:  [phone]  Please note these are generalized instructions for all surgical cases, you may be provided with more specific instructions according to your surgery.

## 2020-11-20 ENCOUNTER — ANESTHESIA EVENT (OUTPATIENT)
Dept: SURGERY | Age: 65
End: 2020-11-20
Payer: MEDICARE

## 2020-11-20 NOTE — PROGRESS NOTES
[address] [facility]  [phone]        Pre-Op Phone Call:     Patient Name: Jeovany Charles     Telephone Information:   Mobile [phone]     Home phone:  [phone]    Surgery Time:    8:10 AM     Arrival Time:  4541     Left extended Message:  NA     Message left with:     Recent change in health status:  No     Advised of transportation/ policy:  Yes     NPO policy reviewed:  Yes pt     Advised to take morning heart/blood pressure medications with sips of water morning of surgery? Yes     Instructed to bring eye drops, photo identification, and insurance card day of surgery? Yes     Advised to wear short sleeved button down shirt (no T-shirt underneath):  Yes     Advised not to wear jewelry, hairpins, or pantyhose day of surgery? Yes     Advised not to wear make-up and to wash face day of surgery?   Yes    Remarks:        Electronically signed by:  Carlos Trammell RN at 11/20/2020 1:11 PM

## 2020-11-23 ENCOUNTER — ANESTHESIA (OUTPATIENT)
Dept: SURGERY | Age: 65
End: 2020-11-23
Payer: MEDICARE

## 2020-11-23 ENCOUNTER — HOSPITAL ENCOUNTER (OUTPATIENT)
Age: 65
Setting detail: OUTPATIENT SURGERY
Discharge: HOME | End: 2020-11-23
Attending: OPHTHALMOLOGY | Admitting: OPHTHALMOLOGY
Payer: MEDICARE

## 2020-11-23 VITALS — SYSTOLIC BLOOD PRESSURE: 132 MMHG | OXYGEN SATURATION: 100 % | DIASTOLIC BLOOD PRESSURE: 59 MMHG

## 2020-11-23 VITALS
DIASTOLIC BLOOD PRESSURE: 62 MMHG | HEART RATE: 66 BPM | TEMPERATURE: 98.4 F | WEIGHT: 127 LBS | RESPIRATION RATE: 11 BRPM | HEIGHT: 61 IN | OXYGEN SATURATION: 99 % | SYSTOLIC BLOOD PRESSURE: 119 MMHG | BODY MASS INDEX: 23.98 KG/M2

## 2020-11-23 PROCEDURE — 2500000003 HC RX 250 WO HCPCS: Performed by: OPHTHALMOLOGY

## 2020-11-23 PROCEDURE — 3700000000 HC ANESTHESIA ATTENDED CARE: Performed by: OPHTHALMOLOGY

## 2020-11-23 PROCEDURE — 6370000000 HC RX 637 (ALT 250 FOR IP): Performed by: OPHTHALMOLOGY

## 2020-11-23 PROCEDURE — 3600000014 HC SURGERY LEVEL 4 ADDTL 15MIN: Performed by: OPHTHALMOLOGY

## 2020-11-23 PROCEDURE — V2632 POST CHMBR INTRAOCULAR LENS: HCPCS | Performed by: OPHTHALMOLOGY

## 2020-11-23 PROCEDURE — 7100000010 HC PHASE II RECOVERY - FIRST 15 MIN: Performed by: OPHTHALMOLOGY

## 2020-11-23 PROCEDURE — 3700000001 HC ADD 15 MINUTES (ANESTHESIA): Performed by: OPHTHALMOLOGY

## 2020-11-23 PROCEDURE — 3600000004 HC SURGERY LEVEL 4 BASE: Performed by: OPHTHALMOLOGY

## 2020-11-23 PROCEDURE — 2709999900 HC NON-CHARGEABLE SUPPLY: Performed by: OPHTHALMOLOGY

## 2020-11-23 DEVICE — LENS INTOCU +22.0 DIOPT L13MM DIA6MM A CONSTANT 118.7 0DEG: Type: IMPLANTABLE DEVICE | Site: EYE | Status: FUNCTIONAL

## 2020-11-23 RX ORDER — KETOROLAC TROMETHAMINE 5 MG/ML
1 SOLUTION OPHTHALMIC SEE ADMIN INSTRUCTIONS
Status: DISCONTINUED | OUTPATIENT
Start: 2020-11-23 | End: 2020-11-23 | Stop reason: HOSPADM

## 2020-11-23 RX ORDER — BRIMONIDINE TARTRATE 0.15 %
DROPS OPHTHALMIC (EYE)
Status: COMPLETED | OUTPATIENT
Start: 2020-11-23 | End: 2020-11-23

## 2020-11-23 RX ORDER — PHENYLEPHRINE HCL 2.5 %
1 DROPS OPHTHALMIC (EYE) SEE ADMIN INSTRUCTIONS
Status: DISCONTINUED | OUTPATIENT
Start: 2020-11-23 | End: 2020-11-23 | Stop reason: HOSPADM

## 2020-11-23 RX ORDER — BALANCED SALT SOLUTION 6.4; .75; .48; .3; 3.9; 1.7 MG/ML; MG/ML; MG/ML; MG/ML; MG/ML; MG/ML
SOLUTION OPHTHALMIC
Status: COMPLETED | OUTPATIENT
Start: 2020-11-23 | End: 2020-11-23

## 2020-11-23 RX ORDER — SODIUM CHLORIDE 0.9 % (FLUSH) 0.9 %
10 SYRINGE (ML) INJECTION EVERY 12 HOURS SCHEDULED
Status: DISCONTINUED | OUTPATIENT
Start: 2020-11-23 | End: 2020-11-23 | Stop reason: HOSPADM

## 2020-11-23 RX ORDER — TETRACAINE HYDROCHLORIDE 5 MG/ML
SOLUTION OPHTHALMIC
Status: COMPLETED | OUTPATIENT
Start: 2020-11-23 | End: 2020-11-23

## 2020-11-23 RX ORDER — SODIUM CHLORIDE 9 MG/ML
INJECTION, SOLUTION INTRAVENOUS CONTINUOUS
Status: DISCONTINUED | OUTPATIENT
Start: 2020-11-23 | End: 2020-11-23 | Stop reason: HOSPADM

## 2020-11-23 RX ORDER — CIPROFLOXACIN HYDROCHLORIDE 3.5 MG/ML
1 SOLUTION/ DROPS TOPICAL SEE ADMIN INSTRUCTIONS
Status: DISCONTINUED | OUTPATIENT
Start: 2020-11-23 | End: 2020-11-23 | Stop reason: HOSPADM

## 2020-11-23 RX ORDER — TROPICAMIDE 10 MG/ML
1 SOLUTION/ DROPS OPHTHALMIC SEE ADMIN INSTRUCTIONS
Status: DISCONTINUED | OUTPATIENT
Start: 2020-11-23 | End: 2020-11-23 | Stop reason: HOSPADM

## 2020-11-23 RX ORDER — TETRACAINE HYDROCHLORIDE 5 MG/ML
1 SOLUTION OPHTHALMIC ONCE
Status: COMPLETED | OUTPATIENT
Start: 2020-11-23 | End: 2020-11-23

## 2020-11-23 RX ORDER — LIDOCAINE HYDROCHLORIDE 10 MG/ML
INJECTION, SOLUTION EPIDURAL; INFILTRATION; INTRACAUDAL; PERINEURAL
Status: COMPLETED | OUTPATIENT
Start: 2020-11-23 | End: 2020-11-23

## 2020-11-23 RX ORDER — SODIUM CHLORIDE 0.9 % (FLUSH) 0.9 %
10 SYRINGE (ML) INJECTION PRN
Status: DISCONTINUED | OUTPATIENT
Start: 2020-11-23 | End: 2020-11-23 | Stop reason: HOSPADM

## 2020-11-23 RX ORDER — CYCLOPENTOLATE HYDROCHLORIDE 10 MG/ML
1 SOLUTION/ DROPS OPHTHALMIC SEE ADMIN INSTRUCTIONS
Status: DISCONTINUED | OUTPATIENT
Start: 2020-11-23 | End: 2020-11-23 | Stop reason: HOSPADM

## 2020-11-23 RX ADMIN — TROPICAMIDE 1 DROP: 10 SOLUTION/ DROPS OPHTHALMIC at 07:33

## 2020-11-23 RX ADMIN — PHENYLEPHRINE HYDROCHLORIDE 1 DROP: 25 SOLUTION/ DROPS OPHTHALMIC at 07:50

## 2020-11-23 RX ADMIN — LIDOCAINE HYDROCHLORIDE: 35 GEL OPHTHALMIC at 07:50

## 2020-11-23 RX ADMIN — KETOROLAC TROMETHAMINE 1 DROP: 5 SOLUTION/ DROPS OPHTHALMIC at 07:50

## 2020-11-23 RX ADMIN — CIPROFLOXACIN 1 DROP: 3 SOLUTION OPHTHALMIC at 07:50

## 2020-11-23 RX ADMIN — PHENYLEPHRINE HYDROCHLORIDE 1 DROP: 25 SOLUTION/ DROPS OPHTHALMIC at 07:33

## 2020-11-23 RX ADMIN — POVIDONE-IODINE 0.1 ML: 5 SOLUTION OPHTHALMIC at 07:50

## 2020-11-23 RX ADMIN — KETOROLAC TROMETHAMINE 1 DROP: 5 SOLUTION/ DROPS OPHTHALMIC at 07:33

## 2020-11-23 RX ADMIN — CIPROFLOXACIN 1 DROP: 3 SOLUTION OPHTHALMIC at 07:33

## 2020-11-23 RX ADMIN — TROPICAMIDE 1 DROP: 10 SOLUTION/ DROPS OPHTHALMIC at 07:50

## 2020-11-23 RX ADMIN — TETRACAINE HYDROCHLORIDE 1 DROP: 5 SOLUTION OPHTHALMIC at 07:32

## 2020-11-23 ASSESSMENT — LIFESTYLE VARIABLES: SMOKING_STATUS: 0

## 2020-11-23 ASSESSMENT — PAIN SCALES - GENERAL
PAINLEVEL_OUTOF10: 0

## 2020-11-23 NOTE — H&P
Date of Surgery Update:  Laurie Marroquin was seen, history and physical examination reviewed, and patient examined by me today. There have been no significant clinical changes since the completion of the previous history and physical. The surgical site was confirmed by the patient and me. The risk, benefits, and alternatives of the proposed procedure have been explained to the patient (or appropriate guardian) and understanding verbalized. All questions answered. Patient wishes to proceed.     Electronically signed by: Christian Nair MD,11/23/2020,10:58 AM

## 2020-11-23 NOTE — OP NOTE
[address]Jeff[address] [place] [address]  [phone]      11/23/2020    Patient name: Victoria Hartman  YOB: 1955  MRN: [id number]    Allergies: Allergies   Allergen Reactions    Gabapentin      LYRICA CYMBALTA  SUICIDIAL IDEATIONS    Hydrocodone-Acetaminophen      Burning    Morphine Hives     Other reaction(s): Other (See Comments)  Arm got red and swollen      Tizanidine      Nightmares       Pre-operative diagnosis:  Cataract Left Eye    Post-operative diagnosis:  Same    Procedure Performed:  Phacoemulsification with insertion of a foldable posterior chamber intraocular lens implant to the left eye. Anesthesia:  Topical Anesthesia with MAC. Estimated Blood Loss: None    Specimens removed: None    Limbal Relaxing Incisions:  Axis:N/A    Arc Length: N/A    Complications:  None    Description of Procedure: In the same day surgery center, the patient was given the usual pre-operative regimen. In the operating room suite, the left eye was prepped and draped in the usual sterile fashion. A paracentesis tract was fashioned, after which 0.5 MI of 1% preservative free Lidocaine was injected into the anterior chamber followed by Viscoat for a complete chamber fill. A clear cornea temporal tunnel was created using a keratome. Under optimal magnification and visualization, a continuous curvilinear capsulorrhexis was performed. Hydro-dissection of the lens was carried out using balanced salt solution. The lens was then phacoemulsified using the divide and conquer technique. Residual cortex was removed using the I/A handpiece followed by thorough posterior capsule polishing. The capsular bag was then filled with Provisc and the lens was gently delivered into the bag, achieving excellent centration. . Provisc was removed using the I/A handpiece and the globe was pressurized using balanced salt solution.  The paracentesis tract and the temporal wound

## 2020-11-23 NOTE — ANESTHESIA PRE PROCEDURE
Department of Anesthesiology  Preprocedure Note       Name:  Jazlyn Amin   Age:  72 y.o.  :  1955                                          MRN:  [id number]         Date:  2020      Surgeon: Trinidad Hutson):  Rema Nguyen MD    Procedure: LEFT EYE Phacoemulsification with intraocular lens implant (Left )    Medications prior to admission:   Prior to Admission medications    Medication Sig Start Date End Date Taking? Authorizing Provider   naproxen (NAPROSYN) 125 MG/5ML suspension Take by mouth 2 times daily    Historical Provider, MD   lisinopril (PRINIVIL;ZESTRIL) 2.5 MG tablet Take 2.5 mg by mouth daily    Historical Provider, MD   aspirin 81 MG EC tablet Take 81 mg by mouth daily    Historical Provider, MD   Cholecalciferol (VITAMIN D) 50 MCG ( UT) CAPS capsule Take by mouth    Historical Provider, MD   Omega-3 Fatty Acids (FISH OIL) 1000 MG CAPS Take 3,000 mg by mouth 3 times daily    Historical Provider, MD   baclofen (LIORESAL) 10 MG tablet Take 10 mg by mouth 3 times daily    Historical Provider, MD   oxyCODONE-acetaminophen (PERCOCET) 5-325 MG per tablet Take 1 tablet by mouth every 4 hours as needed for Pain. Historical Provider, MD   Azelastine-Fluticasone 137-50 MCG/ACT SUSP 1 spray by Nasal route 10/23/18   Historical Provider, MD   ALPRAZolam Isaura Riya) 1 MG tablet Take 1 mg by mouth 2 times daily.      Historical Provider, MD   cycloSPORINE (RESTASIS) 0.05 % ophthalmic emulsion Apply to eye 19   Historical Provider, MD   hydroxychloroquine (PLAQUENIL) 200 MG tablet 1 QD -Thursday and 1 BID Fri-Sun 6/6/18   Historical Provider, MD   lidocaine (LIDODERM) 5 % Place 1 patch onto the skin 19   Historical Provider, MD   predniSONE (DELTASONE) 5 MG tablet Take 10 mg by mouth daily as needed  18   Historical Provider, MD   albuterol sulfate  (90 Base) MCG/ACT inhaler Inhale 2 puffs into the lungs every 6 hours as needed for Wheezing    Historical Provider, MD Current medications:    No current facility-administered medications for this visit. No current outpatient medications on file. Facility-Administered Medications Ordered in Other Visits   Medication Dose Route Frequency Provider Last Rate Last Dose    0.9 % sodium chloride infusion   Intravenous Continuous Kamille Douglas MD        sodium chloride flush 0.9 % injection 10 mL  10 mL Intravenous 2 times per day Kamille Douglas MD        sodium chloride flush 0.9 % injection 10 mL  10 mL Intravenous PRN Kamille Douglas MD        sodium chloride flush 0.9 % injection 10 mL  10 mL Intravenous 2 times per day Hisham Ardyce Kussmaul, MD        sodium chloride flush 0.9 % injection 10 mL  10 mL Intravenous PRN Hisham Ardyce Kussmaul, MD        povidone-iodine 5 % ophthalmic solution 0.1 mL  1 drop Left Eye Once Caesar Barron MD        ciprofloxacin (CILOXAN) 0.3 % ophthalmic solution 1 drop  1 drop Left Eye See [address], MD        lidocaine (AKTEN) 3.5 % ophthalmic gel   Left Eye See [address], MD        tetracaine (TETRAVISC) 0.5 % ophthalmic solution 1 drop  1 drop Left Eye Once Caesar Barron MD        ketorolac (ACULAR) 0.5 % ophthalmic solution 1 drop  1 drop Left Eye See [address], MD        And    cyclopentolate (CYCLOGYL) 1 % ophthalmic solution 1 drop  1 drop Left Eye See [address], MD        And    phenylephrine (MYDFRIN) 2.5 % ophthalmic solution 1 drop  1 drop Left Eye See [address], MD        And    tropicamide (MYDRIACYL) 1 % ophthalmic solution 1 drop  1 drop Left Eye See [address], MD           Allergies: Allergies   Allergen Reactions    Gabapentin      LYRICA CYMBALTA  SUICIDIAL IDEATIONS    Hydrocodone-Acetaminophen      Burning    Morphine Hives     Other reaction(s):  Other (See Comments)  Arm got red and swollen      Tizanidine      Nightmares       Problem List:    Patient Active Problem List   Diagnosis Code    Lumbar stenosis with neurogenic claudication M48.062    S/P lumbar spinal fusion Z98.1       Past Medical History:        Diagnosis Date    Arthritis     RHUE    Back disorder     Brain aneurysm     Carpal tunnel syndrome     GERD (gastroesophageal reflux disease)     Hepatitis C     treated     Hypertension     Osteoporosis     Seasonal allergies        Past Surgical History:        Procedure Laterality Date    BRAIN ANEURYSM SURGERY  2020    COLONOSCOPY      INTRACAPSULAR CATARACT EXTRACTION Right 2020    RIGHT EYE Phacoemulsification with intraocular lens implant performed by Kd Vasquez MD at [facility] N/A 2019    L3-4, L4-5 ANTERIOR LUMBAR INTERBODY FUSION FOLLOWED BY POSTERIOR PEDICLE SCREW FIXATION AND FUSION WITH LEFT L4-5 LAMINOFORAMINOTOMY performed by Palma Bowie MD at [address] [place] [address] TUNNELED VENOUS PORT PLACEMENT         Social History:    Social History     Tobacco Use    Smoking status: Former Smoker     Last attempt to quit: 1994     Years since quittin.2    Smokeless tobacco: Never Used   Substance Use Topics    Alcohol use: Not Currently                                Counseling given: Not Answered      Vital Signs (Current): There were no vitals filed for this visit.                                            BP Readings from Last 3 Encounters:   20 135/69   20 (!) 112/50   19 114/66       NPO Status:  >8h                                                                               BMI:   Wt Readings from Last 3 Encounters:   20 127 lb (57.6 kg)   20 127 lb (57.6 kg)   19 122 lb (55.3 kg)     There is no height or weight on file to calculate BMI.    CBC: No results found for: WBC, RBC, HGB, HCT, MCV, RDW, PLT    CMP:   Lab Results   Component Value Date    CREATININE 0.5 08/14/2019    GFRAA >60 08/14/2019    LABGLOM >60 08/14/2019       POC Tests: No results for input(s): POCGLU, POCNA, POCK, POCCL, POCBUN, POCHEMO, POCHCT in the last 72 hours. Coags: No results found for: PROTIME, INR, APTT    HCG (If Applicable): No results found for: PREGTESTUR, PREGSERUM, HCG, HCGQUANT     ABGs: No results found for: PHART, PO2ART, CIY7QII, EBV3HPE, BEART, M0UHZGBU     Type & Screen (If Applicable):  No results found for: LABABO, [address]    Anesthesia Evaluation  Patient summary reviewed and Nursing notes reviewed no history of anesthetic complications:   Airway: Mallampati: II  TM distance: >3 FB   Neck ROM: full  Mouth opening: > = 3 FB Dental:    (+) upper dentures and lower dentures      Pulmonary:normal exam  breath sounds clear to auscultation      (-) COPD, asthma, recent URI, sleep apnea and not a current smoker                           Cardiovascular:  Exercise tolerance: good (>4 METS),   (+) hypertension:,     (-) past MI, CABG/stent and  angina      Rhythm: regular  Rate: normal                    Neuro/Psych:      (-) seizures, TIA and CVA            ROS comment: Lumbar stenosis, cerebral aneurysm GI/Hepatic/Renal:   (+) GERD: well controlled, hepatitis: C, liver disease:,      (-) hiatal hernia and no renal disease      ROS comment: Treated for Hep C.   Endo/Other:    (+) : arthritis: rheumatoid. , .    (-) diabetes mellitus, hypothyroidism                ROS comment: Osteoporosis Abdominal:           Vascular:     - DVT and PE. Anesthesia Plan      MAC     ASA 3       Induction: intravenous. Anesthetic plan and risks discussed with patient. Plan discussed with CRNA.                   Socrates García MD   11/23/2020

## 2020-11-23 NOTE — ANESTHESIA POSTPROCEDURE EVALUATION
Department of Anesthesiology  Postprocedure Note    Patient: Jeovany Charles  MRN: [id number]  YOB: 1955  Date of evaluation: 11/23/2020  Time:  11:09 AM     Procedure Summary     Date:  11/23/20 Room / Location:  [address]    Anesthesia Start:  5139 Anesthesia Stop:  9260    Procedure:  LEFT EYE Phacoemulsification with intraocular lens implant (Left ) Diagnosis:       Nuclear sclerosis, left      (Nuclear sclerosis, LEFT)    Surgeon:  Isabel Falk MD Responsible Provider:  Jimmy Saravia MD    Anesthesia Type:  MAC ASA Status:  3          Anesthesia Type: MAC    Haider Phase I: Haider Score: 10    Haider Phase II: Haider Score: 10    Last vitals: Reviewed and per EMR flowsheets.        Anesthesia Post Evaluation    Patient location during evaluation: PACU  Patient participation: complete - patient participated  Level of consciousness: awake and alert  Pain score: 0  Airway patency: patent  Nausea & Vomiting: no nausea and no vomiting  Complications: no  Cardiovascular status: blood pressure returned to baseline  Respiratory status: acceptable  Hydration status: euvolemic

## 2021-01-12 ENCOUNTER — VIRTUAL VISIT (OUTPATIENT)
Dept: INTERNAL MEDICINE CLINIC | Age: 66
End: 2021-01-12

## 2021-01-12 DIAGNOSIS — F41.9 ANXIETY: Primary | ICD-10-CM

## 2021-01-12 PROCEDURE — 99999 PR OFFICE/OUTPT VISIT,PROCEDURE ONLY: CPT | Performed by: INTERNAL MEDICINE

## 2021-01-12 ASSESSMENT — PATIENT HEALTH QUESTIONNAIRE - PHQ9
2. FEELING DOWN, DEPRESSED OR HOPELESS: 0
SUM OF ALL RESPONSES TO PHQ9 QUESTIONS 1 & 2: 1
SUM OF ALL RESPONSES TO PHQ QUESTIONS 1-9: 1

## 2021-01-12 NOTE — PROGRESS NOTES
Alejandra Reyes is a 72 y.o. female being evaluated by a Virtual Visit (video visit) encounter to address concerns as mentioned above. A caregiver was present when appropriate. Due to this being a TeleHealth encounter (During VSQPT-73 public health emergency), evaluation of the following organ systems was limited: Vitals/Constitutional/EENT/Resp/CV/GI//MS/Neuro/Skin/Heme-Lymph-Imm. Pursuant to the emergency declaration under the [address] [place] [address], [address] [place] [address] and the [employer] Act, this Virtual Visit was conducted with patient's (and/or legal guardian's) consent, to reduce the patient's risk of exposure to COVID-19 and provide necessary medical care. The patient (and/or legal guardian) has also been advised to contact this office for worsening conditions or problems, and seek emergency medical treatment and/or call 911 if deemed necessary. Patient identification was verified at the start of the visit: Yes    Total time spent on this encounter: not billed by time    Services were provided through a video synchronous discussion virtually to substitute for in-person clinic visit. Patient and provider were located at their individual homes. --Elen Conde MD on 1/12/2021 at 8:36 AM    An electronic signature was used to authenticate this note. This dictation was generated by voice recognition computer software. Although all attempts are made to edit the dictation for accuracy, there may be errors in the transcription that are not intended.

## 2021-01-28 NOTE — PRE-PROCEDURE INSTRUCTIONS
Attempted to call patient about procedure. No answer. Voicemail left. Told to be here at 0730 for procedure at 0900. NPO after midnight, but can take morning medication with sips of water. Office should have told them when and if they should stop any blood thinners. Told to have a responsible adult be with the patient to take them home and stay with them afterwards, if they are not admitted to hospital afterwards. And if available bring current list of medications.

## 2021-01-29 ENCOUNTER — HOSPITAL ENCOUNTER (OUTPATIENT)
Dept: INTERVENTIONAL RADIOLOGY/VASCULAR | Age: 66
Discharge: HOME | End: 2021-01-29
Payer: MEDICARE

## 2021-01-29 VITALS — HEIGHT: 61 IN | TEMPERATURE: 98.5 F | BODY MASS INDEX: 24.55 KG/M2 | WEIGHT: 130 LBS

## 2021-01-29 DIAGNOSIS — M06.9 RHEUMATOID ARTHRITIS INVOLVING BOTH HANDS, UNSPECIFIED WHETHER RHEUMATOID FACTOR PRESENT (HCC): ICD-10-CM

## 2021-01-29 DIAGNOSIS — I67.1 CEREBRAL ANEURYSM: ICD-10-CM

## 2021-01-29 DIAGNOSIS — Z86.79 HISTORY OF CEREBRAL ANEURYSM REPAIR: ICD-10-CM

## 2021-01-29 DIAGNOSIS — Z98.890 HISTORY OF CEREBRAL ANEURYSM REPAIR: ICD-10-CM

## 2021-01-29 LAB
A/G RATIO: 1.6 (ref 1.1–2.2)
ALBUMIN SERPL-MCNC: 3.7 G/DL (ref 3.4–5)
ALP BLD-CCNC: 42 U/L (ref 40–129)
ALT SERPL-CCNC: 16 U/L (ref 10–40)
ANION GAP SERPL CALCULATED.3IONS-SCNC: 7 MMOL/L (ref 3–16)
AST SERPL-CCNC: 29 U/L (ref 15–37)
BILIRUB SERPL-MCNC: 0.3 MG/DL (ref 0–1)
BUN BLDV-MCNC: 18 MG/DL (ref 7–20)
CALCIUM SERPL-MCNC: 8.9 MG/DL (ref 8.3–10.6)
CHLORIDE BLD-SCNC: 106 MMOL/L (ref 99–110)
CO2: 28 MMOL/L (ref 21–32)
CREAT SERPL-MCNC: 0.8 MG/DL (ref 0.6–1.2)
GFR AFRICAN AMERICAN: >60
GFR NON-AFRICAN AMERICAN: >60
GLOBULIN: 2.3 G/DL
GLUCOSE BLD-MCNC: 92 MG/DL (ref 70–99)
HCT VFR BLD CALC: 34.3 % (ref 36–48)
HEMOGLOBIN: 11.2 G/DL (ref 12–16)
INR BLD: 0.96 (ref 0.86–1.14)
MCH RBC QN AUTO: 30.7 PG (ref 26–34)
MCHC RBC AUTO-ENTMCNC: 32.7 G/DL (ref 31–36)
MCV RBC AUTO: 93.9 FL (ref 80–100)
PDW BLD-RTO: 17.7 % (ref 12.4–15.4)
PLATELET # BLD: 168 K/UL (ref 135–450)
PMV BLD AUTO: 8.3 FL (ref 5–10.5)
POTASSIUM SERPL-SCNC: 3.9 MMOL/L (ref 3.5–5.1)
PROTHROMBIN TIME: 11.1 SEC (ref 10–13.2)
RBC # BLD: 3.65 M/UL (ref 4–5.2)
SODIUM BLD-SCNC: 141 MMOL/L (ref 136–145)
TOTAL PROTEIN: 6 G/DL (ref 6.4–8.2)
WBC # BLD: 5.7 K/UL (ref 4–11)

## 2021-01-29 PROCEDURE — C1769 GUIDE WIRE: HCPCS

## 2021-01-29 PROCEDURE — 85610 PROTHROMBIN TIME: CPT

## 2021-01-29 PROCEDURE — 6360000004 HC RX CONTRAST MEDICATION: Performed by: NEUROLOGICAL SURGERY

## 2021-01-29 PROCEDURE — 36224 PLACE CATH CAROTD ART: CPT

## 2021-01-29 PROCEDURE — 36226 PLACE CATH VERTEBRAL ART: CPT

## 2021-01-29 PROCEDURE — 85027 COMPLETE CBC AUTOMATED: CPT

## 2021-01-29 PROCEDURE — 80053 COMPREHEN METABOLIC PANEL: CPT

## 2021-01-29 PROCEDURE — 99152 MOD SED SAME PHYS/QHP 5/>YRS: CPT

## 2021-01-29 PROCEDURE — C1894 INTRO/SHEATH, NON-LASER: HCPCS

## 2021-01-29 PROCEDURE — 99153 MOD SED SAME PHYS/QHP EA: CPT

## 2021-01-29 RX ORDER — HYDROCODONE BITARTRATE AND ACETAMINOPHEN 5; 325 MG/1; MG/1
2 TABLET ORAL EVERY 4 HOURS PRN
Status: DISCONTINUED | OUTPATIENT
Start: 2021-01-29 | End: 2021-01-30 | Stop reason: HOSPADM

## 2021-01-29 RX ORDER — SODIUM CHLORIDE 9 MG/ML
INJECTION, SOLUTION INTRAVENOUS CONTINUOUS
Status: DISCONTINUED | OUTPATIENT
Start: 2021-01-29 | End: 2021-01-30 | Stop reason: HOSPADM

## 2021-01-29 RX ORDER — ACETAMINOPHEN 325 MG/1
650 TABLET ORAL EVERY 4 HOURS PRN
Status: DISCONTINUED | OUTPATIENT
Start: 2021-01-29 | End: 2021-01-30 | Stop reason: HOSPADM

## 2021-01-29 RX ORDER — ONDANSETRON 2 MG/ML
4 INJECTION INTRAMUSCULAR; INTRAVENOUS EVERY 8 HOURS PRN
Status: DISCONTINUED | OUTPATIENT
Start: 2021-01-29 | End: 2021-01-30 | Stop reason: HOSPADM

## 2021-01-29 RX ORDER — HYDROCODONE BITARTRATE AND ACETAMINOPHEN 5; 325 MG/1; MG/1
1 TABLET ORAL EVERY 4 HOURS PRN
Status: DISCONTINUED | OUTPATIENT
Start: 2021-01-29 | End: 2021-01-30 | Stop reason: HOSPADM

## 2021-01-29 RX ORDER — IODIXANOL 270 MG/ML
200 INJECTION, SOLUTION INTRAVASCULAR
Status: COMPLETED | OUTPATIENT
Start: 2021-01-29 | End: 2021-01-29

## 2021-01-29 RX ADMIN — IODIXANOL 200 ML: 270 INJECTION, SOLUTION INTRAVASCULAR at 10:24

## 2021-01-29 NOTE — PROCEDURES
DATE OF THE PROCEDURE: 1/29/2021  HISTORY OF PRESENT ILLNESS: 72year old woman had unruptured right carotid aneurysm embolized in July, 2020 presents now for routine surveillance angiography. OPERATORS: Hector Brown M.D., attending. SUPERVISION AND INTERPRETATION: Dr. Jack Otto personally performed the technical portions of the procedure. Following completion of the technical portions of the procedure, the angiographic images were reviewed at the neurography PACS work station by Dr. Dina Weeks. PROCEDURE:  1. Diagnostic Cerebral Angiogram.     VESSELS CATHETERIZED:   1. Right internal carotid artery. 2. Left internal carotid artery. 3. Left vertebral artery. 4. Right vertebral artery. ANESTHESIA: Prior to the procedure, the patient's personal and family history were reviewed for any adverse reactions to anesthesia and no pertinent concerns were raised. ASA stgstrstastdstest:st st1st Malampati: II  Conscious sedation and intravenous anesthesia was given by the radiology nurse under direct supervision of the attending physician for 45 minutes. Monitored nursing care and medication reports are available on the chart. Local anesthesia was provided at the puncture site with 1% lidocaine. MEDICATIONS GIVEN: Fentanyl IV and Versed IV, 1% lidocaine, subcutaneous. RADIOCONTRAST: 80 mL Optiray 320 contrast IA. EBL: 10 mL    DEVICES USED:   1. Micropuncture kit. 2. 5-Montenegrin sheath. 3. 0.035\" Glidewire. 4. 5-Montenegrin angled glide catheter. 5. 6-Mont[place]grin AngioSeal closure device. PROCEDURE TIME: 45 minutes. FLUOROSCOPY TIME: 4.3 minutes. CONSENT: Prior to the procedure, the technical aspect of the procedure as well as the potential risks and benefits were explained to the patient. Specifically, the risk of cerebral infarction, puncture site hemorrhage, femoral nerve injury, retroperitoneal hemorrhage, hemorrhagic shock, infection, device failure, anaphylaxis, renal failure, limb loss, death, radiation effects (hair loss, cataracts, radiation burns, tumors, dementia), arterial dissection, arterial pseudoaneurysms and A-V fistula were discussed. The advantages and disadvantages of alternative procedures were presented. An opportunity to state any questions or concerns was given and these were then addressed. Following this process, it was requested that we proceed with the proposed intervention and this was expressed in the form of a written consent. DETAILS OF THE PROCEDURE: The patient was brought to the neuroangiography suite where the groin was shaved, prepped and draped in usual sterile fashion. The left common femoral artery was accessed percutaneously with local anesthetic using a single wall puncture technique. A 5-Jordanian arterial introducer sheath was placed at the puncture site prior to arterial catheterization. A 5-Jordanian angled catheter was advanced over a Glidewire into the aortic arch under the fluoroscopic guidance and used to selectively catheterize the vessels listed above. In each case, the vessel origin was visualized fluoroscopically by injection of contrast prior to selective catheterization. A biplane diagnostic cerebral angiogram was acquired at each of these catheterizations. After reviewing the images, the catheter was removed. The groin sheath was exchanged over a 6-Jordanian AngioSeal closure device and the arteriotomy was closed without difficulty. The patient tolerated the procedure well and there were no complications.      FINDINGS: LEFT CAROTID, INTRACRANIAL:  Adequate contrast enhancement of the carotid artery is seen. The ophthalmic artery is adequately patent. The carotid terminus is normal with adequate caliber on both A1 and M1 segments. The capillary and venous phases are within normal limits. There are no signs of fistulas, dissections or de grabiel aneurysms. RIGHT CAROTID, INTRACRANIAL: The right internal carotid artery has normal caliber over its entire course. The embolized aneurysm is completely occluded with no residual or recurrence and no compromise of the carotid lumen. The middle and anterior cerebral arteries fill normally. The middle and anterior cerebral arteries have normal distribution to the cortical branches and the capillary and venous phases are normal.     LEFT VERTEBRAL, INTRACRANIAL: Normal contrast enhancement of the vertebral artery is noted. It demonstrates a normal contour and caliber. The left posterior inferior cerebellar artery is adequately seen. The basilar artery appears normal course and caliber. There is normal filling of bilateral superior cerebellar arteries and bilateral posterior cerebral arteries. RIGHT VERTEBRAL, INTRACRANIAL:  The right vertebral artery has a normal caliber as does the basilar artery. The right posterior inferior cerebellar artery appears normal. There is filling of bilateral anterior inferior cerebellar arteries, bilateral superior cerebellar arteries and bilateral posterior cerebellar arteries. There is no capillary filling defect or delay. LEFT COMMON FEMORAL ARTERY: The common, iliac, external iliac and common femoral arteries are normal in course and caliber except for mild vasospasm at the puncture site. The common femoral artery bifurcation appears normal. The groin sheath was inserted within the common femoral artery above the bifurcation without evidence of any injury or thrombosis.      IMPRESSION: 1. Progression to complete occlusion of the embolized right carotid ophthalmic segment aneurysm.  Niranjan 1.  2. Otherwise normal 4 vessel cerebral angiogram

## 2021-01-29 NOTE — H&P
History of Present Illness   Chief Complaint: known cerebral aneurysm; 2nd opinion    HPI: MsRobb Lorenzana and her  came in today for follow-up after embolization of her unruptured right carotid aneurysm with the WEB intrasaccular flow diverter. She's had no symptoms or issues since her discharge the next day. She is still taking her new antihypertensives and a daily aspirin. She's hopeful that she can stop taking the antihypertensives in the future. Vital Signs for Today's Encounter     Height: 5' 2'' Weight: 127 pounds    BMI: 23.23    Medical History       Prior neck/back surgery? no  Has pt. had any other surgery? no    Details: None of These  Comorbidities:rheumatoid arthritis,liver disease  Supplemental oxygen? no  Bleeding/clotting disorder? no  Blood thinner?none  Has the patient had angioplasty? no  Does patient have stents? no  Other implant: none    Allergies   Allergic to shellfish, contrast dye, or iodine? no  Allergic to latex? no  Allergic to metals/jewelry? no  Allergic to steroids? no  Allergic to antibiotics? no  Allergic to tape? no  Other allergies? no    Social History   Does the patient live with someone else? yes  Is this person able to help at home if surgery is needed? yes  Smoking status: never      Other tobacco user? never    Alcohol consumed: infrequent/social    How many drinks/week? 3  Drug use: never     Work History   Is the patient employed? retired  Occupation: retired  Has patient returned to work? no or retired      Family History   Stroke or aneurysm? no  Malignant hyperthermia? no      Review of Histories and Systems   I reviewed the patient's past medical history, social history, family history, and review of systems. The patient recorded this information in our chart and I reviewed it personally.       Physical Exam   General   General appearance: well groomed, cooperative  Build and nutrition: normal  Posture: normal    Eyes Visual acuity: grossly normal with correction  Visual fields: grossly intact by confrontation  Pupils: equal, round, reactive to light and accommodation  EOM: extraocular movements intact, normal gaze alignment      Musculoskeletal   Gait and station: normal  RUE muscle strength: 5/5 strength throughout  RUE muscle tone: normal  LUE muscle strength: 5/5 strength throughout  LUE muscle tone: normal  RLE muscle strength: 5/5 throughout  RLE muscle tone: normal  LLE muscle strength: 5/5 throughout  LLE muscle tone: normal      Neurologic   DTR Right: 2+ symmetric  DTR Left: 2+ symmetric  Cerebellar function: normal    Sensory   Light touch: normal    Cranial Nerves  5th (Trigeminal): normal corneal reflex  7th (Facial): no weakness  8th (Auditory): normal hearing with whispered voice or finger rub  9th (Darryl Ramiro): gag reflex normal  11th (Spinal access): no atrophy, normal strength bilaterally  12th (Hypoglossal): tongue midline on protrusion, no fasciculations    Mental Status Exam   Affect: normal  Orientation: oriented to time, place, and person  Attention span/Concentration/Cognitive function: all normal  Memory: normal  Speech/language: normal  Fund of knowledge: able to name months, seasons, current president  Thought content: normal        Assessment   Impression: Ms. Jovanny Wilhelm has done very well since embolization of her unruptured right carotid aneurysm with the WEB intrasaccular flow diverter. She has no activity restrictions at this time. I advised her that she'd likely need her antihypertensives long term, but that she could investigate that need with her PCP in the future. She will continue her daily aspirin as well, but 81mg daily is fine for our purpose. Due to the low risk of aneurysm recurrence, we will obtain a routine angiogram at 6, 12 and 24 months after the date of aneurysm coiling with MRA follow up for years 3,4, and 5.               Sigel score: 0 no symptoms  Symptoms: better Activity level: returned to full activity        Electronically Signed by Patricia Sylvester MD on 08/13/2020 at 11:27 AM    ________________________________________________________________________    Update History & Physical    The patient's History and Physical of August 13, 2020 was reviewed with the patient and I examined the patient. There was no change. The surgical site was confirmed by the patient and me. Plan: The risks, benefits, expected outcome, and alternative to the recommended procedure have been discussed with the patient. Patient understands and wants to proceed with the procedure.      Electronically signed by Claudia Sears MD on 1/29/2021 at 8:14 AM

## 2021-02-16 ENCOUNTER — HOSPITAL ENCOUNTER (OUTPATIENT)
Dept: MAMMOGRAPHY | Age: 66
Discharge: HOME | End: 2021-02-21
Payer: MEDICARE

## 2021-02-16 DIAGNOSIS — Z12.31 VISIT FOR SCREENING MAMMOGRAM: ICD-10-CM

## 2021-04-06 ENCOUNTER — HOSPITAL ENCOUNTER (OUTPATIENT)
Dept: MAMMOGRAPHY | Age: 66
Discharge: HOME | End: 2021-04-11
Payer: MEDICARE

## 2021-04-06 VITALS — BODY MASS INDEX: 24.17 KG/M2 | HEIGHT: 61 IN | WEIGHT: 128 LBS

## 2021-04-06 DIAGNOSIS — Z12.31 VISIT FOR SCREENING MAMMOGRAM: ICD-10-CM

## 2021-04-06 PROCEDURE — 77067 SCR MAMMO BI INCL CAD: CPT

## 2022-01-06 NOTE — PRE-PROCEDURE INSTRUCTIONS
Called patient about procedure. Told to be here at 1200 for procedure at 1330. Must be NPO after midnight but can take morning medication with sips of water. Patient stated they are not on blood thinners, stopped ASA and naproxen about 5 days ago. Told to have a responsible adult with them to take them home and stay with them afterwards, if they do not get admitted to hospital. Also, to bring a current list of medications. No other questions or concerns.

## 2022-01-07 ENCOUNTER — HOSPITAL ENCOUNTER (OUTPATIENT)
Dept: INTERVENTIONAL RADIOLOGY/VASCULAR | Age: 67
Discharge: HOME | End: 2022-01-07
Attending: NEUROLOGICAL SURGERY | Admitting: NEUROLOGICAL SURGERY
Payer: MEDICARE

## 2022-01-07 VITALS — TEMPERATURE: 98.5 F | HEIGHT: 61 IN | BODY MASS INDEX: 24.55 KG/M2 | WEIGHT: 130 LBS

## 2022-01-07 PROBLEM — I67.1 CEREBRAL ANEURYSM: Status: ACTIVE | Noted: 2022-01-07

## 2022-01-07 LAB
A/G RATIO: 2 (ref 1.1–2.2)
ALBUMIN SERPL-MCNC: 4.1 G/DL (ref 3.4–5)
ALP BLD-CCNC: 50 U/L (ref 40–129)
ALT SERPL-CCNC: 10 U/L (ref 10–40)
ANION GAP SERPL CALCULATED.3IONS-SCNC: 6 MMOL/L (ref 3–16)
AST SERPL-CCNC: 18 U/L (ref 15–37)
BILIRUB SERPL-MCNC: 0.5 MG/DL (ref 0–1)
BUN BLDV-MCNC: 13 MG/DL (ref 7–20)
CALCIUM SERPL-MCNC: 8.2 MG/DL (ref 8.3–10.6)
CHLORIDE BLD-SCNC: 105 MMOL/L (ref 99–110)
CO2: 26 MMOL/L (ref 21–32)
CREAT SERPL-MCNC: 0.7 MG/DL (ref 0.6–1.2)
GFR AFRICAN AMERICAN: >60
GFR NON-AFRICAN AMERICAN: >60
GLUCOSE BLD-MCNC: 83 MG/DL (ref 70–99)
HCT VFR BLD CALC: 36.6 % (ref 36–48)
HEMOGLOBIN: 12 G/DL (ref 12–16)
INR BLD: 1.03 (ref 0.88–1.12)
MCH RBC QN AUTO: 32.3 PG (ref 26–34)
MCHC RBC AUTO-ENTMCNC: 32.8 G/DL (ref 31–36)
MCV RBC AUTO: 98.6 FL (ref 80–100)
PDW BLD-RTO: 14.9 % (ref 12.4–15.4)
PLATELET # BLD: 219 K/UL (ref 135–450)
PMV BLD AUTO: 7.7 FL (ref 5–10.5)
POTASSIUM SERPL-SCNC: 4.2 MMOL/L (ref 3.5–5.1)
PROTHROMBIN TIME: 11.6 SEC (ref 9.9–12.7)
RBC # BLD: 3.71 M/UL (ref 4–5.2)
SODIUM BLD-SCNC: 137 MMOL/L (ref 136–145)
TOTAL PROTEIN: 6.2 G/DL (ref 6.4–8.2)
WBC # BLD: 5.9 K/UL (ref 4–11)

## 2022-01-07 PROCEDURE — 6360000002 HC RX W HCPCS

## 2022-01-07 PROCEDURE — 99153 MOD SED SAME PHYS/QHP EA: CPT

## 2022-01-07 PROCEDURE — C1894 INTRO/SHEATH, NON-LASER: HCPCS

## 2022-01-07 PROCEDURE — 85610 PROTHROMBIN TIME: CPT

## 2022-01-07 PROCEDURE — 80053 COMPREHEN METABOLIC PANEL: CPT

## 2022-01-07 PROCEDURE — 85027 COMPLETE CBC AUTOMATED: CPT

## 2022-01-07 PROCEDURE — 6360000004 HC RX CONTRAST MEDICATION: Performed by: NEUROLOGICAL SURGERY

## 2022-01-07 PROCEDURE — C1769 GUIDE WIRE: HCPCS

## 2022-01-07 PROCEDURE — 36224 PLACE CATH CAROTD ART: CPT

## 2022-01-07 PROCEDURE — 2500000003 HC RX 250 WO HCPCS

## 2022-01-07 PROCEDURE — C1887 CATHETER, GUIDING: HCPCS

## 2022-01-07 PROCEDURE — 36226 PLACE CATH VERTEBRAL ART: CPT

## 2022-01-07 PROCEDURE — 99152 MOD SED SAME PHYS/QHP 5/>YRS: CPT

## 2022-01-07 RX ORDER — SODIUM CHLORIDE 9 MG/ML
INJECTION, SOLUTION INTRAVENOUS CONTINUOUS
Status: DISCONTINUED | OUTPATIENT
Start: 2022-01-07 | End: 2022-01-07 | Stop reason: HOSPADM

## 2022-01-07 RX ORDER — IODIXANOL 270 MG/ML
100 INJECTION, SOLUTION INTRAVASCULAR
Status: COMPLETED | OUTPATIENT
Start: 2022-01-07 | End: 2022-01-07

## 2022-01-07 RX ORDER — ONDANSETRON 2 MG/ML
4 INJECTION INTRAMUSCULAR; INTRAVENOUS EVERY 8 HOURS PRN
Status: DISCONTINUED | OUTPATIENT
Start: 2022-01-07 | End: 2022-01-07 | Stop reason: HOSPADM

## 2022-01-07 RX ADMIN — IODIXANOL 100 ML: 270 INJECTION, SOLUTION INTRAVASCULAR at 14:19

## 2022-01-07 NOTE — H&P
Chief Complaint: known cerebral aneurysm; 2nd opinion     HPI: Ms. Riky Maxwell and her  came in today for follow-up after embolization of her unruptured right carotid aneurysm with the WEB intrasaccular flow diverter. She's had no symptoms or issues since her discharge the next day. She is still taking her new antihypertensives and a daily aspirin. She's hopeful that she can stop taking the antihypertensives in the future.     Vital Signs for Today's Encounter      Height: 5' 2'' Weight: 127 pounds     BMI: 23.23     Medical History         Prior neck/back surgery? no  Has pt. had any other surgery? no    Details: None of These  Comorbidities:rheumatoid arthritis,liver disease  Supplemental oxygen? no  Bleeding/clotting disorder? no  Blood thinner?none  Has the patient had angioplasty? no  Does patient have stents? no  Other implant: none     Allergies   Allergic to shellfish, contrast dye, or iodine? no  Allergic to latex? no  Allergic to metals/jewelry? no  Allergic to steroids? no  Allergic to antibiotics? no  Allergic to tape? no  Other allergies? no     Social History   Does the patient live with someone else? yes  Is this person able to help at home if surgery is needed? yes  Smoking status: never      Other tobacco user? never    Alcohol consumed: infrequent/social    How many drinks/week? 3  Drug use: never     Work History   Is the patient employed? retired  Occupation: retired  Has patient returned to work? no or retired        Family History   Stroke or aneurysm? no  Malignant hyperthermia? no        Review of Histories and Systems   I reviewed the patient's past medical history, social history, family history, and review of systems.  The patient recorded this information in our chart and I reviewed it personally.        Physical Exam   General   General appearance: well groomed, cooperative  Build and nutrition: normal  Posture: normal     Eyes  Visual acuity: grossly normal with correction  Visual fields: grossly intact by confrontation  Pupils: equal, round, reactive to light and accommodation  EOM: extraocular movements intact, normal gaze alignment        Musculoskeletal   Gait and station: normal  RUE muscle strength: 5/5 strength throughout  RUE muscle tone: normal  LUE muscle strength: 5/5 strength throughout  LUE muscle tone: normal  RLE muscle strength: 5/5 throughout  RLE muscle tone: normal  LLE muscle strength: 5/5 throughout  LLE muscle tone: normal        Neurologic   DTR Right: 2+ symmetric  DTR Left: 2+ symmetric  Cerebellar function: normal     Sensory   Light touch: normal     Cranial Nerves  5th (Trigeminal): normal corneal reflex  7th (Facial): no weakness  8th (Auditory): normal hearing with whispered voice or finger rub  9th (Lennette Mima): gag reflex normal  11th (Spinal access): no atrophy, normal strength bilaterally  12th (Hypoglossal): tongue midline on protrusion, no fasciculations     Mental Status Exam   Affect: normal  Orientation: oriented to time, place, and person  Attention span/Concentration/Cognitive function: all normal  Memory: normal  Speech/language: normal  Fund of knowledge: able to name months, seasons, current president  Thought content: normal           Assessment   Impression: Ms. Palak Garcia has done very well since embolization of her unruptured right carotid aneurysm with the WEB intrasaccular flow diverter. She has no activity restrictions at this time. I advised her that she'd likely need her antihypertensives long term, but that she could investigate that need with her PCP in the future. She will continue her daily aspirin as well, but 81mg daily is fine for our purpose.   Due to the low risk of aneurysm recurrence, we will obtain a routine angiogram at 6, 12 and 24 months after the date of aneurysm coiling with MRA follow up for years 3,4, and 5.               Dresden score: 0 no symptoms  Symptoms: better  Activity level: returned to full activity           Electronically Signed by Fracisco Maxwell MD on 08/13/2020 at 11:27 AM    Update History & Physical    The patient's History and Physical of August 13, 202 was reviewed with the patient and I examined the patient. There was no change. The surgical site was confirmed by the patient and me. Plan: The risks, benefits, expected outcome, and alternative to the recommended procedure have been discussed with the patient. Patient understands and wants to proceed with the procedure.      Electronically signed by Sirena Garcia MD on 1/7/2022 at 1:50 PM

## 2022-01-20 NOTE — PROCEDURES
DATE OF THE PROCEDURE: 1/7/2022  HISTORY OF PRESENT ILLNESS: 77year old woman who underwent embolization of a right carotid ophthalmic segment aneurysm with WEB intrasaccular flow diverter in July, 2020 presents for routine surveillance angiography    OPERATORS: Rachel Heck M.D., attending. SUPERVISION AND INTERPRETATION: Dr. Vicky Otto personally performed the technical portions of the procedure. Following completion of the technical portions of the procedure, the angiographic images were reviewed at the neurography PACS work station by Dr. Cheryle Dean. PROCEDURE:  1. Diagnostic Cerebral Angiogram.     VESSELS CATHETERIZED:   1. Right internal carotid artery. 2. Left internal carotid artery. 3. Left vertebral artery. 4. Right vertebral artery. ANESTHESIA: Prior to the procedure, the patient's personal and family history were reviewed for any adverse reactions to anesthesia and no pertinent concerns were raised. ASA rdgrdrrdarddrderd:rd rd3rd Malampati: II  Conscious sedation and intravenous anesthesia was given by the radiology nurse under direct supervision of the attending physician for 30 minutes. Monitored nursing care and medication reports are available on the chart. Local anesthesia was provided at the puncture site with 1% lidocaine. MEDICATIONS GIVEN: Fentanyl IV and Versed IV, 1% lidocaine, subcutaneous. RADIOCONTRAST: 75 mL Optiray 320 contrast IA. EBL: 10 mL    DEVICES USED:   1. Micropuncture kit. 2. 5-Azerbaijani sheath. 3. 0.035\" Glidewire. 4. 5-Azerbaijani angled glide catheter. 5. 6-[place]zerbaij[place] AngioSeal closure device. PROCEDURE TIME: 30 minutes. FLUOROSCOPY TIME: 5.7 minutes. CONSENT: Prior to the procedure, the technical aspect of the procedure as well as the potential risks and benefits were explained to the patient.  Specifically, the risk of cerebral infarction, puncture site hemorrhage, femoral nerve injury, retroperitoneal hemorrhage, hemorrhagic shock, infection, device failure, anaphylaxis, renal failure, limb loss, death, radiation effects (hair loss, cataracts, radiation burns, tumors, dementia), arterial dissection, arterial pseudoaneurysms and A-V fistula were discussed. The advantages and disadvantages of alternative procedures were presented. An opportunity to state any questions or concerns was given and these were then addressed. Following this process, it was requested that we proceed with the proposed intervention and this was expressed in the form of a written consent. DETAILS OF THE PROCEDURE: The patient was brought to the neuroangiography suite where the groin was shaved, prepped and draped in usual sterile fashion. The left common femoral artery was accessed percutaneously with local anesthetic using a single wall puncture technique. A 5-Citizen of the [place] arterial introducer sheath was placed at the puncture site prior to arterial catheterization. A 5-Citizen of the [place] angled catheter was advanced over a Glidewire into the aortic arch under the fluoroscopic guidance and used to selectively catheterize the vessels listed above. In each case, the vessel origin was visualized fluoroscopically by injection of contrast prior to selective catheterization. A biplane diagnostic cerebral angiogram was acquired at each of these catheterizations. After reviewing the images, the catheter was removed. The groin sheath was exchanged over a 6-Citizen of the [place] AngioSeal closure device and the arteriotomy was closed without difficulty. The patient tolerated the procedure well and there were no complications. FINDINGS:   LEFT CAROTID, INTRACRANIAL:  Adequate contrast enhancement of the carotid artery is seen. The ophthalmic artery is adequately patent. The carotid terminus is normal with adequate caliber on both A1 and M1 segments. The capillary and venous phases are within normal limits. There are no signs of fistulas, dissections or de grabiel aneurysms.      RIGHT CAROTID, INTRACRANIAL: The right internal carotid artery has normal caliber over its entire course. The embolized aneurysm has progressed to complete occlusion. The middle and anterior cerebral arteries fill normally. The middle and anterior cerebral arteries have normal distribution to the cortical branches and the capillary and venous phases are normal.     LEFT VERTEBRAL, INTRACRANIAL: Normal contrast enhancement of the vertebral artery is noted. It demonstrates a normal contour and caliber. The left posterior inferior cerebellar artery is adequately seen. The basilar artery appears normal course and caliber. There is normal filling of bilateral superior cerebellar arteries and bilateral posterior cerebral arteries. RIGHT VERTEBRAL, INTRACRANIAL:  The right vertebral artery has a normal caliber as does the basilar artery. The right posterior inferior cerebellar artery appears normal. There is filling of bilateral anterior inferior cerebellar arteries, bilateral superior cerebellar arteries and bilateral posterior cerebellar arteries. There is no capillary filling defect or delay. LEFT COMMON FEMORAL ARTERY: The common, iliac, external iliac and common femoral arteries are normal in course and caliber. The common femoral artery bifurcation appears normal. The groin sheath was inserted within the common femoral artery above the bifurcation without evidence of any injury or thrombosis. IMPRESSION:  1.  Progression to complete occlusion of the embolized right carotid ophthalmic aneurysm, Niranjan 1.  2. Otherwise normal 4 vessel cerebral angiogram

## 2022-03-17 NOTE — PROGRESS NOTES
ENDOSCOPY PREOP INSTRUCTIONS       You are scheduled for a procedure at [facility] on 3-18 @ 0730.  You will need to arrival by: 0600 (at least an hour & a half prior to planned start time)   Report to the MAIN entrance on [address] and register at the information desk on the left-hand side of the lobby   You will need your insurance & photo ID with you. For your procedure:      PLEASE FOLLOW ALL INSTRUCTIONS & PREPS GIVEN TO YOU FROM YOUR DOCTOR'S OFFICE.  If you have not received these instructions yet, please call the office immediately. Make sure to read them as soon as received.  Bring an accurate list of any medications, including the dose/ frequency, with you on the day of the procedure. Make sure to include over the counter medications.  If you are taking blood thinners, Aspirin or diabetic medication, make sure to call your doctor as soon as possible for instructions prior to your procedure.  All patients having anesthesia or sedation are required to be Fully Vaccinated (at least 14 days) prior to procedure - OR - have a Covid PCR test within the 5-6 days prior to the procedure. The results will need to be faxed to PreAdmission Testing at [phone] or Emailed to [email or web address]      Please dress comfortably and do not wear any lotion, powders or jewelry   Arrange for someone to be with you and sign you out & drive you home after your procedure.  We allow 1 visitor with you in the hospital & both of you are required to be masked.  WOMEN ONLY OF CHILDBEARING AGE: Please make sure to be able to give a urine sample on arrival      If you have further questions, you may contact your Endoscopist's office or Pre Admission Testing staff at [phone] "[employer] 3/17/2022 .10:03 AM

## 2022-03-18 ENCOUNTER — HOSPITAL ENCOUNTER (OUTPATIENT)
Age: 67
Setting detail: OUTPATIENT SURGERY
Discharge: HOME | End: 2022-03-18
Attending: INTERNAL MEDICINE | Admitting: INTERNAL MEDICINE
Payer: MEDICARE

## 2022-03-18 ENCOUNTER — ANESTHESIA (OUTPATIENT)
Dept: ENDOSCOPY | Age: 67
End: 2022-03-18
Payer: MEDICARE

## 2022-03-18 ENCOUNTER — ANESTHESIA EVENT (OUTPATIENT)
Dept: ENDOSCOPY | Age: 67
End: 2022-03-18
Payer: MEDICARE

## 2022-03-18 VITALS
SYSTOLIC BLOOD PRESSURE: 123 MMHG | HEART RATE: 56 BPM | BODY MASS INDEX: 24.73 KG/M2 | RESPIRATION RATE: 16 BRPM | WEIGHT: 131 LBS | HEIGHT: 61 IN | DIASTOLIC BLOOD PRESSURE: 78 MMHG | OXYGEN SATURATION: 100 % | TEMPERATURE: 98.3 F

## 2022-03-18 VITALS
SYSTOLIC BLOOD PRESSURE: 104 MMHG | OXYGEN SATURATION: 100 % | DIASTOLIC BLOOD PRESSURE: 51 MMHG | RESPIRATION RATE: 17 BRPM

## 2022-03-18 DIAGNOSIS — Z12.11 SCREENING FOR COLON CANCER: ICD-10-CM

## 2022-03-18 DIAGNOSIS — K21.9 GASTROESOPHAGEAL REFLUX DISEASE, UNSPECIFIED WHETHER ESOPHAGITIS PRESENT: ICD-10-CM

## 2022-03-18 PROCEDURE — 6360000002 HC RX W HCPCS: Performed by: INTERNAL MEDICINE

## 2022-03-18 PROCEDURE — 88305 TISSUE EXAM BY PATHOLOGIST: CPT

## 2022-03-18 PROCEDURE — 7100000010 HC PHASE II RECOVERY - FIRST 15 MIN: Performed by: INTERNAL MEDICINE

## 2022-03-18 PROCEDURE — 6360000002 HC RX W HCPCS

## 2022-03-18 PROCEDURE — 2709999900 HC NON-CHARGEABLE SUPPLY: Performed by: INTERNAL MEDICINE

## 2022-03-18 PROCEDURE — 3609012400 HC EGD TRANSORAL BIOPSY SINGLE/MULTIPLE: Performed by: INTERNAL MEDICINE

## 2022-03-18 PROCEDURE — 3700000001 HC ADD 15 MINUTES (ANESTHESIA): Performed by: INTERNAL MEDICINE

## 2022-03-18 PROCEDURE — 7100000011 HC PHASE II RECOVERY - ADDTL 15 MIN: Performed by: INTERNAL MEDICINE

## 2022-03-18 PROCEDURE — 3609010600 HC COLONOSCOPY POLYPECTOMY SNARE/COLD BIOPSY: Performed by: INTERNAL MEDICINE

## 2022-03-18 PROCEDURE — 3700000000 HC ANESTHESIA ATTENDED CARE: Performed by: INTERNAL MEDICINE

## 2022-03-18 RX ORDER — LIDOCAINE HYDROCHLORIDE 10 MG/ML
1 INJECTION, SOLUTION EPIDURAL; INFILTRATION; INTRACAUDAL; PERINEURAL
Status: DISCONTINUED | OUTPATIENT
Start: 2022-03-18 | End: 2022-03-18 | Stop reason: HOSPADM

## 2022-03-18 RX ORDER — SODIUM CHLORIDE 9 MG/ML
25 INJECTION, SOLUTION INTRAVENOUS PRN
Status: DISCONTINUED | OUTPATIENT
Start: 2022-03-18 | End: 2022-03-18 | Stop reason: HOSPADM

## 2022-03-18 RX ORDER — SODIUM CHLORIDE, SODIUM LACTATE, POTASSIUM CHLORIDE, CALCIUM CHLORIDE 600; 310; 30; 20 MG/100ML; MG/100ML; MG/100ML; MG/100ML
INJECTION, SOLUTION INTRAVENOUS CONTINUOUS
Status: DISCONTINUED | OUTPATIENT
Start: 2022-03-18 | End: 2022-03-18 | Stop reason: HOSPADM

## 2022-03-18 RX ORDER — LIDOCAINE HYDROCHLORIDE 20 MG/ML
INJECTION, SOLUTION INTRAVENOUS PRN
Status: DISCONTINUED | OUTPATIENT
Start: 2022-03-18 | End: 2022-03-18 | Stop reason: SDUPTHER

## 2022-03-18 RX ORDER — SODIUM CHLORIDE 0.9 % (FLUSH) 0.9 %
5-40 SYRINGE (ML) INJECTION PRN
Status: DISCONTINUED | OUTPATIENT
Start: 2022-03-18 | End: 2022-03-18 | Stop reason: HOSPADM

## 2022-03-18 RX ORDER — HEPARIN SODIUM (PORCINE) LOCK FLUSH IV SOLN 100 UNIT/ML 100 UNIT/ML
100 SOLUTION INTRAVENOUS PRN
Status: DISCONTINUED | OUTPATIENT
Start: 2022-03-18 | End: 2022-03-18 | Stop reason: HOSPADM

## 2022-03-18 RX ORDER — PROPOFOL 10 MG/ML
INJECTION, EMULSION INTRAVENOUS CONTINUOUS PRN
Status: DISCONTINUED | OUTPATIENT
Start: 2022-03-18 | End: 2022-03-18 | Stop reason: SDUPTHER

## 2022-03-18 RX ORDER — SODIUM CHLORIDE 0.9 % (FLUSH) 0.9 %
5-40 SYRINGE (ML) INJECTION EVERY 12 HOURS SCHEDULED
Status: DISCONTINUED | OUTPATIENT
Start: 2022-03-18 | End: 2022-03-18 | Stop reason: HOSPADM

## 2022-03-18 RX ADMIN — LIDOCAINE HYDROCHLORIDE 50 MG: 20 INJECTION, SOLUTION INTRAVENOUS at 07:44

## 2022-03-18 RX ADMIN — HEPARIN SODIUM (PORCINE) LOCK FLUSH IV SOLN 100 UNIT/ML 100 UNITS: 100 SOLUTION at 08:42

## 2022-03-18 RX ADMIN — PROPOFOL 200 MCG/KG/MIN: 10 INJECTION, EMULSION INTRAVENOUS at 07:44

## 2022-03-18 ASSESSMENT — PULMONARY FUNCTION TESTS
PIF_VALUE: 1
PIF_VALUE: 0
PIF_VALUE: 0
PIF_VALUE: 1
PIF_VALUE: 0
PIF_VALUE: 1

## 2022-03-18 ASSESSMENT — PAIN - FUNCTIONAL ASSESSMENT
PAIN_FUNCTIONAL_ASSESSMENT: 0-10

## 2022-03-18 ASSESSMENT — PAIN SCALES - GENERAL: PAINLEVEL_OUTOF10: 0

## 2022-03-18 ASSESSMENT — LIFESTYLE VARIABLES: SMOKING_STATUS: 0

## 2022-03-18 NOTE — H&P
[facility] ENDOSCOPY  Outpatient Procedure H&P    Patient: Mathew Cesar MRN: [id number]     YOB: 1955  Age: 77 y.o. Sex: female    Unit: [facility] ENDOSCOPY Room/Bed: Endo Pool/NONE Location: [address] [place] [address]     Procedure: Procedure(s):  ESOPHAGOGASTRODUODENOSCOPY  COLONOSCOPY    Indication: Gastroesophageal reflux disease, unspecified whether esophagitis present [K21.9] Screening for colon cancer [Z12.11]    Referring  Physician:          Nurses past medical history notes reviewed and agreed. Medications reviewed.     Allergies: Dilaudid  [hydromorphone], Roxicodone  [oxycodone], Duloxetine, Gabapentin, Grass pollen(k-o-r-t-swt mary), Hydrocodone-acetaminophen, Molds & smuts, Morphine, and Tizanidine     Allergies noted: Yes     Past Medical History:   Past Medical History:   Diagnosis Date    Arthritis     RHUE    Back disorder     Brain aneurysm     Carpal tunnel syndrome     GERD (gastroesophageal reflux disease)     Hepatitis C     treated     Hypertension     Osteoporosis     Seasonal allergies        Past Surgical History:   Past Surgical History:   Procedure Laterality Date    BRAIN ANEURYSM SURGERY  07/2020    COLONOSCOPY      INTRACAPSULAR CATARACT EXTRACTION Right 11/9/2020    RIGHT EYE Phacoemulsification with intraocular lens implant performed by Reshma Barrera MD at [place] [address] Left 11/23/2020    LEFT EYE Phacoemulsification with intraocular lens implant performed by Reshma Barrera MD at [facility] N/A 8/14/2019    L3-4, L4-5 ANTERIOR LUMBAR INTERBODY FUSION FOLLOWED BY POSTERIOR PEDICLE SCREW FIXATION AND FUSION WITH LEFT L4-5 LAMINOFORAMINOTOMY performed by Jerri Leon MD at [address] [place] [address]      TUNNELED VENOUS PORT PLACEMENT         Social History:   Social History     Socioeconomic History    Marital status:      Spouse name: Not on file    Number of children: Not on file    Years of education: Not on file    Highest education level: Not on file   Occupational History    Not on file   Tobacco Use    Smoking status: Former Smoker     Packs/day: 0.00     Years: 15.00     Pack years: 0.00     Types: Cigarettes     Quit date: 1994     Years since quittin.6    Smokeless tobacco: Never Used   Vaping Use    Vaping Use: Never used   Substance and Sexual Activity    Alcohol use: Not Currently    Drug use: Never    Sexual activity: Not on file   Other Topics Concern    Not on file   Social History Narrative    Not on file     Social Determinants of Health     Financial Resource Strain:     Difficulty of Paying Living Expenses: Not on file   Food Insecurity:     Worried About [address] in the Last Year: Not on file    Aman of Food in the Last Year: Not on file   Transportation Needs:     Lack of Transportation (Medical): Not on file    Lack of Transportation (Non-Medical): Not on file   Physical Activity:     Days of Exercise per Week: Not on file    Minutes of Exercise per Session: Not on file   Stress:     Feeling of Stress : Not on file   Social Connections:     Frequency of Communication with Friends and Family: Not on file    Frequency of Social Gatherings with Friends and Family: Not on file    Attends Church Services: Not on file    Active Member of [address] [place] [address] or Organizations: Not on file    Attends Club or Organization Meetings: Not on file    Marital Status: Not on file   Intimate Partner Violence:     Fear of Current or Ex-Partner: Not on file    Emotionally Abused: Not on file    Physically Abused: Not on file    Sexually Abused: Not on file   Housing Stability:     Unable to Pay for Housing in the Last Year: Not on file    Number of [place] in the Last Year: Not on file    Unstable Housing in the Last Year: Not on file       Family History: History reviewed. No pertinent family history.     Home Medications: Prior to Admission medications    Medication Sig Start Date End Date Taking? Authorizing Provider   naproxen (NAPROSYN) 125 MG/5ML suspension Take by mouth 2 times daily    Historical Provider, MD   lisinopril (PRINIVIL;ZESTRIL) 2.5 MG tablet Take 2.5 mg by mouth daily    Historical Provider, MD   aspirin 81 MG EC tablet Take 81 mg by mouth daily    Historical Provider, MD   Cholecalciferol (VITAMIN D) 50 MCG (2000 UT) CAPS capsule Take by mouth    Historical Provider, MD   Omega-3 Fatty Acids (FISH OIL) 1000 MG CAPS Take 3,000 mg by mouth 3 times daily    Historical Provider, MD   baclofen (LIORESAL) 10 MG tablet Take 10 mg by mouth 3 times daily    Historical Provider, MD   oxyCODONE-acetaminophen (PERCOCET) 5-325 MG per tablet Take 1 tablet by mouth every 4 hours as needed for Pain.     Historical Provider, MD   Azelastine-Fluticasone 137-50 MCG/ACT SUSP 1 spray by Nasal route 10/23/18   Historical Provider, MD   cycloSPORINE (RESTASIS) 0.05 % ophthalmic emulsion Apply to eye 2/23/19   Historical Provider, MD   hydroxychloroquine (PLAQUENIL) 200 MG tablet 1 QD M-Thursday and 1 BID Fri-Sun 6/6/18   Historical Provider, MD   lidocaine (LIDODERM) 5 % Place 1 patch onto the skin 1/28/19   Historical Provider, MD   predniSONE (DELTASONE) 5 MG tablet Take 10 mg by mouth daily as needed  6/19/18   Historical Provider, MD   albuterol sulfate  (90 Base) MCG/ACT inhaler Inhale 2 puffs into the lungs every 6 hours as needed for Wheezing    Historical Provider, MD       Review of Systems:  Weight Loss: No  Dysphagia: No  Dyspepsia: No  Melena: no  Chest pain: no    Physical Exam:   Vital Signs: /67   Pulse 62   Temp 98.2 °F (36.8 °C) (Temporal)   Resp 16   Ht 5' 1\" (1.549 m)   Wt 131 lb (59.4 kg)   LMP  (LMP Unknown) Comment: age 43  SpO2 98%   BMI 24.75 kg/m²   Vital signs reviewed:Yes    HEENT:Normal  Cardiac:Normal  Chest:Normal  Abdomen:Normal  Exts: Normal  Neuro:Normal    Labs:  Admission on 01/07/2022, Discharged on 01/07/2022   Component Date Value Ref Range Status    WBC 01/07/2022 5.9  4.0 - 11.0 K/uL Final    RBC 01/07/2022 3.71* 4.00 - 5.20 M/uL Final    Hemoglobin 01/07/2022 12.0  12.0 - 16.0 g/dL Final    Hematocrit 01/07/2022 36.6  36.0 - 48.0 % Final    MCV 01/07/2022 98.6  80.0 - 100.0 fL Final    MCH 01/07/2022 32.3  26.0 - 34.0 pg Final    MCHC 01/07/2022 32.8  31.0 - 36.0 g/dL Final    RDW 01/07/2022 14.9  12.4 - 15.4 % Final    Platelets 60/76/1356 219  135 - 450 K/uL Final    MPV 01/07/2022 7.7  5.0 - 10.5 fL Final    Protime 01/07/2022 11.6  9.9 - 12.7 sec Final    INR 01/07/2022 1.03  0.88 - 1.12 Final    Sodium 01/07/2022 137  136 - 145 mmol/L Final    Potassium 01/07/2022 4.2  3.5 - 5.1 mmol/L Final    Chloride 01/07/2022 105  99 - 110 mmol/L Final    CO2 01/07/2022 26  21 - 32 mmol/L Final    Anion Gap 01/07/2022 6  3 - 16 Final    Glucose 01/07/2022 83  70 - 99 mg/dL Final    BUN 01/07/2022 13  7 - 20 mg/dL Final    CREATININE 01/07/2022 0.7  0.6 - 1.2 mg/dL Final    GFR Non- 01/07/2022 >60  >60 Final    GFR  01/07/2022 >60  >60 Final    Calcium 01/07/2022 8.2* 8.3 - 10.6 mg/dL Final    Total Protein 01/07/2022 6.2* 6.4 - 8.2 g/dL Final    Albumin 01/07/2022 4.1  3.4 - 5.0 g/dL Final    Albumin/Globulin Ratio 01/07/2022 2.0  1.1 - 2.2 Final    Total Bilirubin 01/07/2022 0.5  0.0 - 1.0 mg/dL Final    Alkaline Phosphatase 01/07/2022 50  40 - 129 U/L Final    ALT 01/07/2022 10  10 - 40 U/L Final    AST 01/07/2022 18  15 - 37 U/L Final        Imaging:  No orders to display       ASA:2    Mallampati Score: II    Sedation planned:MAC    Patient in acceptable condition for procedure:Yes    7:44 AM 3/18/2022    Vishnu Mccurdy MD      Please note that some or all of this record was generated using voice recognition software.  If there are any questions about the content of this document, please contact the author as some errors in

## 2022-03-18 NOTE — ANESTHESIA POSTPROCEDURE EVALUATION
Department of Anesthesiology  Postprocedure Note    Patient: Michelle Diaz  MRN: [id number]  YOB: 1955  Date of evaluation: 3/18/2022  Time:  8:26 AM     Procedure Summary     Date: 03/18/22 Room / Location: [facility]    Anesthesia Start: 5885 Anesthesia Stop: 0818    Procedures:       EGD BIOPSY (N/A )      COLONOSCOPY POLYPECTOMY SNARE/COLD BIOPSY Diagnosis:       Gastroesophageal reflux disease, unspecified whether esophagitis present      Screening for colon cancer      (Gastroesophageal reflux disease, unspecified whether esophagitis present [K21.9] Screening for colon cancer [Z12.11])    Surgeons: Tushar Martin MD Responsible Provider: Danni Richardson DO    Anesthesia Type: MAC ASA Status: 3          Anesthesia Type: MAC    Haider Phase I: Haider Score: 10    Haider Phase II: Haider Score: 10    Last vitals: Reviewed and per EMR flowsheets.        Anesthesia Post Evaluation    Patient location during evaluation: PACU  Patient participation: complete - patient participated  Level of consciousness: awake and alert  Pain score: 0  Airway patency: patent  Nausea & Vomiting: no nausea and no vomiting  Complications: no  Cardiovascular status: hemodynamically stable  Respiratory status: acceptable  Hydration status: stable

## 2022-05-18 ENCOUNTER — HOSPITAL ENCOUNTER (OUTPATIENT)
Dept: MAMMOGRAPHY | Age: 67
Discharge: HOME | End: 2022-05-23
Payer: MEDICARE

## 2022-05-18 VITALS — HEIGHT: 61 IN | WEIGHT: 135 LBS | BODY MASS INDEX: 25.49 KG/M2

## 2022-05-18 DIAGNOSIS — Z12.31 VISIT FOR SCREENING MAMMOGRAM: ICD-10-CM

## 2022-05-18 PROCEDURE — 77063 BREAST TOMOSYNTHESIS BI: CPT

## 2023-02-01 ENCOUNTER — HOSPITAL ENCOUNTER (OUTPATIENT)
Dept: MRI IMAGING | Age: 68
Discharge: HOME | End: 2023-02-01
Payer: MEDICARE

## 2023-02-01 DIAGNOSIS — I67.1 CEREBRAL ANEURYSM: ICD-10-CM

## 2023-02-01 PROCEDURE — 70544 MR ANGIOGRAPHY HEAD W/O DYE: CPT

## 2023-08-21 ENCOUNTER — HOSPITAL ENCOUNTER (OUTPATIENT)
Dept: MAMMOGRAPHY | Age: 68
Discharge: HOME | End: 2023-08-26
Payer: MEDICARE

## 2023-08-21 VITALS — WEIGHT: 138 LBS | BODY MASS INDEX: 26.06 KG/M2 | HEIGHT: 61 IN

## 2023-08-21 DIAGNOSIS — Z12.31 VISIT FOR SCREENING MAMMOGRAM: ICD-10-CM

## 2023-08-21 DIAGNOSIS — Z13.9 VISIT FOR SCREENING: ICD-10-CM

## 2023-08-21 PROCEDURE — 77067 SCR MAMMO BI INCL CAD: CPT

## 2024-01-25 ENCOUNTER — HOSPITAL ENCOUNTER (OUTPATIENT)
Dept: MRI IMAGING | Age: 69
Discharge: HOME | End: 2024-01-25

## 2024-01-25 DIAGNOSIS — I67.1 CEREBRAL ANEURYSM: ICD-10-CM

## 2024-02-14 ENCOUNTER — HOSPITAL ENCOUNTER (OUTPATIENT)
Dept: MRI IMAGING | Age: 69
Discharge: HOME | End: 2024-02-14
Attending: NEUROLOGICAL SURGERY
Payer: MEDICARE

## 2024-02-14 DIAGNOSIS — I67.1 CEREBRAL ANEURYSM: ICD-10-CM

## 2024-02-14 PROCEDURE — 70544 MR ANGIOGRAPHY HEAD W/O DYE: CPT

## 2024-05-17 ENCOUNTER — TRANSCRIBE ORDERS (OUTPATIENT)
Dept: ADMINISTRATIVE | Age: 69
End: 2024-05-17

## 2024-05-17 DIAGNOSIS — M79.604 RIGHT LEG PAIN: Primary | ICD-10-CM

## 2024-07-12 ENCOUNTER — HOSPITAL ENCOUNTER (OUTPATIENT)
Dept: MRI IMAGING | Age: 69
Discharge: HOME | End: 2024-07-12

## 2024-07-12 DIAGNOSIS — M79.604 RIGHT LEG PAIN: ICD-10-CM

## 2024-09-05 ENCOUNTER — HOSPITAL ENCOUNTER (OUTPATIENT)
Dept: MAMMOGRAPHY | Age: 69
Discharge: HOME | End: 2024-09-10
Payer: MEDICARE

## 2024-09-05 DIAGNOSIS — Z12.31 VISIT FOR SCREENING MAMMOGRAM: ICD-10-CM

## 2024-09-05 PROCEDURE — 77063 BREAST TOMOSYNTHESIS BI: CPT

## 2024-09-25 ENCOUNTER — HOSPITAL ENCOUNTER (OUTPATIENT)
Dept: MRI IMAGING | Age: 69
Discharge: HOME | End: 2024-09-25
Payer: MEDICARE

## 2024-09-25 DIAGNOSIS — M79.604 RIGHT LEG PAIN: ICD-10-CM

## 2024-09-25 PROCEDURE — 72148 MRI LUMBAR SPINE W/O DYE: CPT

## 2024-11-21 ENCOUNTER — HOSPITAL ENCOUNTER (OUTPATIENT)
Dept: MRI IMAGING | Age: 69
Discharge: HOME | End: 2024-11-21

## 2024-11-21 DIAGNOSIS — M54.2 NECK PAIN: ICD-10-CM

## 2024-12-02 ENCOUNTER — HOSPITAL ENCOUNTER (OUTPATIENT)
Dept: MRI IMAGING | Age: 69
Discharge: HOME | End: 2024-12-02
Payer: MEDICARE

## 2024-12-02 PROCEDURE — 72141 MRI NECK SPINE W/O DYE: CPT

## 2025-01-22 ENCOUNTER — HOSPITAL ENCOUNTER (OUTPATIENT)
Dept: MRI IMAGING | Age: 70
Discharge: HOME | End: 2025-01-22
Attending: NEUROLOGICAL SURGERY
Payer: MEDICARE

## 2025-01-22 DIAGNOSIS — I67.1 CEREBRAL ANEURYSM, NONRUPTURED: ICD-10-CM

## 2025-01-22 PROCEDURE — 70544 MR ANGIOGRAPHY HEAD W/O DYE: CPT

## (undated) DEVICE — SURGICAL PROCEDURE PACK PIK PPK374205] ALCON LABORATORIES INC]

## (undated) DEVICE — APPLICATOR PREP 26ML 0.7% IOD POVACRYLEX 74% ISO ALC ST

## (undated) DEVICE — Device: Brand: MEDEX

## (undated) DEVICE — APPLIER LIG CLP M L11IN TI STR RNG HNDL FOR 20 CLP DISP

## (undated) DEVICE — NEURO SPONGES: Brand: DEROYAL

## (undated) DEVICE — SUTURE VCRL SZ 3-0 L27IN ABSRB UD L26MM SH 1/2 CIR J416H

## (undated) DEVICE — 3M™ IOBAN™ 2 ANTIMICROBIAL INCISE DRAPE 6648EZ: Brand: IOBAN™ 2

## (undated) DEVICE — ELECTROSURGICAL PENCIL ROCKER SWITCH NON COATED BLADE ELECTRODE 10 FT (3 M) CORD HOLSTER: Brand: MEGADYNE

## (undated) DEVICE — DRAPE 33X23IN INCISE ANTIMICROB IOBAN 2

## (undated) DEVICE — SYRINGE TB 1ML NDL 25GA L0.625IN PLAS SLIP TIP CONVENTIONAL

## (undated) DEVICE — CANNULA SAMP CO2 AD GRN 7FT CO2 AND 7FT O2 TBNG UNIV CONN

## (undated) DEVICE — GARMENT,MEDLINE,DVT,INT,CALF,MED, GEN2: Brand: MEDLINE

## (undated) DEVICE — SPONGE,NEURO,0.5"X3",XR,STRL,LF,10/PK: Brand: MEDLINE

## (undated) DEVICE — SNARE COLD DIAMOND 15MM THIN

## (undated) DEVICE — TURNOVER KIT RM INF CTRL TECH

## (undated) DEVICE — SUTURE VCRL SZ 3-0 L27IN ABSRB UD L36MM CT-1 1/2 CIR J258H

## (undated) DEVICE — SUTURE PERMAHAND SZ 2-0 L12X18IN NONABSORBABLE BLK SILK A185H

## (undated) DEVICE — SUTURE VCRL SZ 2 L27IN ABSRB VLT L65MM TP-1 1/2 CIR J649G

## (undated) DEVICE — FORCEPS BX L240CM JAW DIA2.8MM L CAP W/ NDL MIC MESH TOOTH

## (undated) DEVICE — Z DUP USE 2701075 SYSTEM SKIN CLSR 42CM DERMBND PRINEO

## (undated) DEVICE — SURE SET-DOUBLE BASIN-LF: Brand: MEDLINE INDUSTRIES, INC.

## (undated) DEVICE — NEEDLE SPNL L3.5IN PNK HUB S STL REG WALL FIT STYL W/ QNCKE

## (undated) DEVICE — CATHETER IV 14GA L5.25IN PERIPH ORNG FEP POLYMER 3 BVL

## (undated) DEVICE — COVER,TABLE,HEAVY DUTY,77"X90",STRL: Brand: MEDLINE

## (undated) DEVICE — TRAY CATHETER 16FR F INCLUDE BARDX IC COMPLT CARE DRNGE BG

## (undated) DEVICE — Z DISCOTINUED USE 2306229 KIT PT CARE POS PD SL BAR PD CRADL PLLW FOR 5321 WILSON +

## (undated) DEVICE — TOWEL,OR,DSP,ST,BLUE,DLX,8/PK,10PK/CS: Brand: MEDLINE

## (undated) DEVICE — LAMINECTOMY PK

## (undated) DEVICE — SPONGE,DISSECTOR,K,XRAY,9/16"X1/4",STRL: Brand: MEDLINE

## (undated) DEVICE — COVER LT HNDL BLU PLAS

## (undated) DEVICE — GLOVE SURG SZ 8 FNGR THK94MIL STRW LTX POLYMER BEAD CUF MIC

## (undated) DEVICE — APPLIER CLP L9.375IN APER 2.1MM CLS L3.8MM 20 SM TI CLP

## (undated) DEVICE — CORD,CAUTERY,BIPOLAR,STERILE: Brand: MEDLINE

## (undated) DEVICE — SUTURE PERMAHAND SZ 2-0 L30IN NONABSORBABLE BLK SILK W/O A305H

## (undated) DEVICE — GLOVE SURG SZ 85 L12IN FNGR THK87MIL WHT LTX FREE

## (undated) DEVICE — SHEET, T, LAPAROTOMY, STERILE: Brand: MEDLINE

## (undated) DEVICE — TRAP POLYP ETRAP

## (undated) DEVICE — TUBING, SUCTION, 1/4" X 12', STRAIGHT: Brand: MEDLINE

## (undated) DEVICE — SOLUTION IV IRRIG WATER 500ML POUR BRL ST 2F7113

## (undated) DEVICE — SOLUTION IRRIG BSS ST 500ML

## (undated) DEVICE — SYRINGE MED 3ML CLR PLAS STD N CTRL LUERLOCK TIP DISP

## (undated) DEVICE — SPONGE,LAP,18"X18",DLX,XR,ST,5/PK,40/PK: Brand: MEDLINE

## (undated) DEVICE — STAPLER SKIN H3.9MM WIRE DIA0.58MM CRWN 6.9MM 35 STPL ROT

## (undated) DEVICE — BLADE ES L4IN INSUL EDGE

## (undated) DEVICE — PACK,UNIVERSAL,NO GOWNS: Brand: MEDLINE

## (undated) DEVICE — SPONGE,LAP,4"X18",XR,ST,5/PK,40PK/CS: Brand: MEDLINE INDUSTRIES, INC.

## (undated) DEVICE — DRAPE C ARM UNIV W41XL74IN CLR PLAS XR VELC CLSR POLY STRP

## (undated) DEVICE — SURGICAL PROC PACK SHT WEST V4

## (undated) DEVICE — WAX SURG 2.5GM HEMSTAT BNE BEESWAX PARAFFIN ISO PALMITATE

## (undated) DEVICE — SUTURE VCRL SZ 0 L18IN ABSRB UD L36MM CT-1 1/2 CIR J840D

## (undated) DEVICE — SYRINGE MED 30ML STD CLR PLAS LUERLOCK TIP N CTRL DISP

## (undated) DEVICE — CABLE BPLR L12FT FLYING LD DISPOSABLE

## (undated) DEVICE — PLATE ES AD W 9FT CRD 2

## (undated) DEVICE — TOOL 14MH30 LEGEND 14CM 3MM: Brand: MIDAS REX ™

## (undated) DEVICE — SYRINGE, LUER LOCK, 10ML: Brand: MEDLINE

## (undated) DEVICE — GLOVE SURG SZ 75 L12IN FNGR THK87MIL DK GRN LTX FREE ISOLEX

## (undated) DEVICE — SUTURE MCRYL SZ 4-0 L27IN ABSRB UD L19MM PS-2 1/2 CIR PRIM Y426H

## (undated) DEVICE — SUTURE PDS II SZ 0 L60IN ABSRB VLT L48MM CTX 1/2 CIR Z990G

## (undated) DEVICE — E-Z CLEAN, NON-STICK, PTFE COATED, ELECTROSURGICAL BLADE ELECTRODE, MODIFIED EXTENDED INSULATION, 2.5 INCH (6.35 CM): Brand: MEGADYNE